# Patient Record
Sex: FEMALE | Race: WHITE | NOT HISPANIC OR LATINO
[De-identification: names, ages, dates, MRNs, and addresses within clinical notes are randomized per-mention and may not be internally consistent; named-entity substitution may affect disease eponyms.]

---

## 2023-03-17 PROBLEM — Z00.00 ENCOUNTER FOR PREVENTIVE HEALTH EXAMINATION: Status: ACTIVE | Noted: 2023-03-17

## 2023-03-20 ENCOUNTER — APPOINTMENT (OUTPATIENT)
Dept: VASCULAR SURGERY | Facility: CLINIC | Age: 73
End: 2023-03-20

## 2023-04-18 ENCOUNTER — INPATIENT (INPATIENT)
Facility: HOSPITAL | Age: 73
LOS: 2 days | Discharge: NURSING, REHAB OR LONG-TERM CARE | DRG: 603 | End: 2023-04-21
Attending: HOSPITALIST | Admitting: STUDENT IN AN ORGANIZED HEALTH CARE EDUCATION/TRAINING PROGRAM
Payer: MEDICARE

## 2023-04-18 VITALS
WEIGHT: 293 LBS | OXYGEN SATURATION: 94 % | DIASTOLIC BLOOD PRESSURE: 106 MMHG | TEMPERATURE: 98 F | HEART RATE: 76 BPM | SYSTOLIC BLOOD PRESSURE: 169 MMHG | RESPIRATION RATE: 18 BRPM | HEIGHT: 70 IN

## 2023-04-18 LAB
ALBUMIN SERPL ELPH-MCNC: 3.8 G/DL — SIGNIFICANT CHANGE UP (ref 3.3–5)
ALP SERPL-CCNC: 101 U/L — SIGNIFICANT CHANGE UP (ref 40–120)
ALT FLD-CCNC: SIGNIFICANT CHANGE UP (ref 10–45)
ANION GAP SERPL CALC-SCNC: 13 MMOL/L — SIGNIFICANT CHANGE UP (ref 5–17)
APTT BLD: 27.9 SEC — SIGNIFICANT CHANGE UP (ref 27.5–35.5)
AST SERPL-CCNC: SIGNIFICANT CHANGE UP (ref 10–40)
BASE EXCESS BLDV CALC-SCNC: 7 MMOL/L — HIGH (ref -2–3)
BASOPHILS # BLD AUTO: 0.04 K/UL — SIGNIFICANT CHANGE UP (ref 0–0.2)
BASOPHILS NFR BLD AUTO: 0.4 % — SIGNIFICANT CHANGE UP (ref 0–2)
BILIRUB SERPL-MCNC: 0.4 MG/DL — SIGNIFICANT CHANGE UP (ref 0.2–1.2)
BUN SERPL-MCNC: 24 MG/DL — HIGH (ref 7–23)
CA-I SERPL-SCNC: 1.17 MMOL/L — SIGNIFICANT CHANGE UP (ref 1.15–1.33)
CALCIUM SERPL-MCNC: 9.2 MG/DL — SIGNIFICANT CHANGE UP (ref 8.4–10.5)
CHLORIDE SERPL-SCNC: 96 MMOL/L — SIGNIFICANT CHANGE UP (ref 96–108)
CK MB CFR SERPL CALC: 1.8 NG/ML — SIGNIFICANT CHANGE UP (ref 0–6.7)
CK SERPL-CCNC: SIGNIFICANT CHANGE UP (ref 25–170)
CO2 BLDV-SCNC: 37.4 MMOL/L — HIGH (ref 22–26)
CO2 SERPL-SCNC: 27 MMOL/L — SIGNIFICANT CHANGE UP (ref 22–31)
CREAT SERPL-MCNC: 1.05 MG/DL — SIGNIFICANT CHANGE UP (ref 0.5–1.3)
EGFR: 57 ML/MIN/1.73M2 — LOW
EOSINOPHIL # BLD AUTO: 0.16 K/UL — SIGNIFICANT CHANGE UP (ref 0–0.5)
EOSINOPHIL NFR BLD AUTO: 1.5 % — SIGNIFICANT CHANGE UP (ref 0–6)
GAS PNL BLDV: 134 MMOL/L — LOW (ref 136–145)
GAS PNL BLDV: SIGNIFICANT CHANGE UP
GLUCOSE SERPL-MCNC: 134 MG/DL — HIGH (ref 70–99)
HCO3 BLDV-SCNC: 35 MMOL/L — HIGH (ref 22–29)
HCT VFR BLD CALC: 43.5 % — SIGNIFICANT CHANGE UP (ref 34.5–45)
HGB BLD-MCNC: 13.2 G/DL — SIGNIFICANT CHANGE UP (ref 11.5–15.5)
IMM GRANULOCYTES NFR BLD AUTO: 0.4 % — SIGNIFICANT CHANGE UP (ref 0–0.9)
INR BLD: 1.02 — SIGNIFICANT CHANGE UP (ref 0.88–1.16)
LACTATE SERPL-SCNC: 1.7 MMOL/L — SIGNIFICANT CHANGE UP (ref 0.5–2)
LYMPHOCYTES # BLD AUTO: 1.17 K/UL — SIGNIFICANT CHANGE UP (ref 1–3.3)
LYMPHOCYTES # BLD AUTO: 11.2 % — LOW (ref 13–44)
MCHC RBC-ENTMCNC: 26.5 PG — LOW (ref 27–34)
MCHC RBC-ENTMCNC: 30.3 GM/DL — LOW (ref 32–36)
MCV RBC AUTO: 87.3 FL — SIGNIFICANT CHANGE UP (ref 80–100)
MONOCYTES # BLD AUTO: 0.79 K/UL — SIGNIFICANT CHANGE UP (ref 0–0.9)
MONOCYTES NFR BLD AUTO: 7.5 % — SIGNIFICANT CHANGE UP (ref 2–14)
NEUTROPHILS # BLD AUTO: 8.28 K/UL — HIGH (ref 1.8–7.4)
NEUTROPHILS NFR BLD AUTO: 79 % — HIGH (ref 43–77)
NRBC # BLD: 0 /100 WBCS — SIGNIFICANT CHANGE UP (ref 0–0)
NT-PROBNP SERPL-SCNC: 83 PG/ML — SIGNIFICANT CHANGE UP (ref 0–300)
PCO2 BLDV: 67 MMHG — HIGH (ref 39–42)
PH BLDV: 7.33 — SIGNIFICANT CHANGE UP (ref 7.32–7.43)
PLATELET # BLD AUTO: 270 K/UL — SIGNIFICANT CHANGE UP (ref 150–400)
PO2 BLDV: <33 MMHG — SIGNIFICANT CHANGE UP (ref 25–45)
POTASSIUM BLDV-SCNC: 4.9 MMOL/L — SIGNIFICANT CHANGE UP (ref 3.5–5.1)
POTASSIUM SERPL-MCNC: SIGNIFICANT CHANGE UP (ref 3.5–5.3)
POTASSIUM SERPL-SCNC: SIGNIFICANT CHANGE UP (ref 3.5–5.3)
PROT SERPL-MCNC: 8.6 G/DL — HIGH (ref 6–8.3)
PROTHROM AB SERPL-ACNC: 12.1 SEC — SIGNIFICANT CHANGE UP (ref 10.5–13.4)
RBC # BLD: 4.98 M/UL — SIGNIFICANT CHANGE UP (ref 3.8–5.2)
RBC # FLD: 16.9 % — HIGH (ref 10.3–14.5)
SAO2 % BLDV: 34.8 % — LOW (ref 67–88)
SODIUM SERPL-SCNC: 136 MMOL/L — SIGNIFICANT CHANGE UP (ref 135–145)
WBC # BLD: 10.48 K/UL — SIGNIFICANT CHANGE UP (ref 3.8–10.5)
WBC # FLD AUTO: 10.48 K/UL — SIGNIFICANT CHANGE UP (ref 3.8–10.5)

## 2023-04-18 PROCEDURE — 99222 1ST HOSP IP/OBS MODERATE 55: CPT

## 2023-04-18 PROCEDURE — 71045 X-RAY EXAM CHEST 1 VIEW: CPT | Mod: 26

## 2023-04-18 PROCEDURE — 99285 EMERGENCY DEPT VISIT HI MDM: CPT

## 2023-04-18 PROCEDURE — 99221 1ST HOSP IP/OBS SF/LOW 40: CPT

## 2023-04-18 PROCEDURE — 99223 1ST HOSP IP/OBS HIGH 75: CPT | Mod: GC

## 2023-04-18 RX ORDER — ACETAMINOPHEN 500 MG
650 TABLET ORAL EVERY 6 HOURS
Refills: 0 | Status: DISCONTINUED | OUTPATIENT
Start: 2023-04-18 | End: 2023-04-21

## 2023-04-18 RX ORDER — LANOLIN ALCOHOL/MO/W.PET/CERES
3 CREAM (GRAM) TOPICAL AT BEDTIME
Refills: 0 | Status: DISCONTINUED | OUTPATIENT
Start: 2023-04-18 | End: 2023-04-21

## 2023-04-18 RX ORDER — ACETAMINOPHEN 500 MG
650 TABLET ORAL ONCE
Refills: 0 | Status: COMPLETED | OUTPATIENT
Start: 2023-04-18 | End: 2023-04-18

## 2023-04-18 RX ORDER — MORPHINE SULFATE 50 MG/1
2 CAPSULE, EXTENDED RELEASE ORAL ONCE
Refills: 0 | Status: DISCONTINUED | OUTPATIENT
Start: 2023-04-18 | End: 2023-04-18

## 2023-04-18 RX ORDER — PIPERACILLIN AND TAZOBACTAM 4; .5 G/20ML; G/20ML
3.38 INJECTION, POWDER, LYOPHILIZED, FOR SOLUTION INTRAVENOUS ONCE
Refills: 0 | Status: COMPLETED | OUTPATIENT
Start: 2023-04-18 | End: 2023-04-18

## 2023-04-18 RX ORDER — ENOXAPARIN SODIUM 100 MG/ML
40 INJECTION SUBCUTANEOUS EVERY 12 HOURS
Refills: 0 | Status: DISCONTINUED | OUTPATIENT
Start: 2023-04-19 | End: 2023-04-20

## 2023-04-18 RX ORDER — VANCOMYCIN HCL 1 G
1000 VIAL (EA) INTRAVENOUS ONCE
Refills: 0 | Status: COMPLETED | OUTPATIENT
Start: 2023-04-18 | End: 2023-04-18

## 2023-04-18 RX ADMIN — Medication 650 MILLIGRAM(S): at 21:48

## 2023-04-18 RX ADMIN — Medication 650 MILLIGRAM(S): at 20:50

## 2023-04-18 RX ADMIN — PIPERACILLIN AND TAZOBACTAM 3.38 GRAM(S): 4; .5 INJECTION, POWDER, LYOPHILIZED, FOR SOLUTION INTRAVENOUS at 23:43

## 2023-04-18 RX ADMIN — MORPHINE SULFATE 2 MILLIGRAM(S): 50 CAPSULE, EXTENDED RELEASE ORAL at 21:48

## 2023-04-18 RX ADMIN — Medication 250 MILLIGRAM(S): at 20:50

## 2023-04-18 RX ADMIN — MORPHINE SULFATE 2 MILLIGRAM(S): 50 CAPSULE, EXTENDED RELEASE ORAL at 21:56

## 2023-04-18 RX ADMIN — Medication 1000 MILLIGRAM(S): at 21:48

## 2023-04-18 RX ADMIN — PIPERACILLIN AND TAZOBACTAM 200 GRAM(S): 4; .5 INJECTION, POWDER, LYOPHILIZED, FOR SOLUTION INTRAVENOUS at 22:02

## 2023-04-18 NOTE — H&P ADULT - HISTORY OF PRESENT ILLNESS
72F PMH HTN, DVT (many yrs ago after childbirth, not on AC), chronic b/l LE lymphedema p/w concern for cellulitis. Pt reports fall roughly 6 months ago, required PRASHANT, and has been bedbound since return home. Was in several rehab facilities. Last hospitalization and abx roughly 5 months ago. Has chronic b/l LE edema/ulcers and redness. Pt reports visiting nurse twice a week. While evaluated earlier today both visiting nurse and patient noted increased redness in B/L LE extending to mid, medial thigh. Notably patient reports edema is unchanged (also extending to medial mid thigh) with overlying chronic skin changes. Patient also notes family contacted Dr. Chavis and was referred to [facility] ED. On ROS pt notes chronic intermittent lower back pain. Patient evaluated by vacular surgery in ED, neurovacularly intact.     Initial vital signs: T: 98.5F, HR: 72-76, BP: 169/106 improved to 126/66 s/p pain control, R: 14, SpO2: 96% RA    Labs: significant for 10.48, Hgb 13.2, Plt 270, no L shift present, Cr 1.05, unknown baseline, lactate 1.7, BUN 24, TP 8.6, albumin 3.8, glucose 134,     CXR: poor inspiratory effort without conolidation. CPA clear B/L.      EKG: NSR with T wave inversions of II, III, aVF, currently asymptomatic. Qtc on admission 460ms    Medications: tylenol 650mg x1, morphine 2mg x1, zosyn 3.375g IVPB (22:00), vanco 1g IVPB 20:50)

## 2023-04-18 NOTE — H&P ADULT - PROBLEM SELECTOR PLAN 2
Which has limited patient's ability to ambulate, despite multiple PRASHANT admissions. Patient notes stable edema with overlying skin changes reaching mid medial thigh. Per medical reconciliation patient on ammonium lactate 12%, clobetasol cream 0.05%, clotrimazole cream 1%, and nystatin powder q8h.   - restarted home ammonium lactate, clotrimazole, and nystatin  - holding clobetasol i/s/o overlying cellulitis   - f/u wound care   - f/u PT eval    #obesity: given BMI, chronic venous status changes, and a random glucose 134 on admission, there is concern for underlying DM v pre-DM  - f/u A1c in AM   - TSH WNL on admission

## 2023-04-18 NOTE — ED ADULT NURSE NOTE - OBJECTIVE STATEMENT
73 y/o female c/o b/l leg swelling and weeping w/redness. as per pt, "I have cellulitis and my legs are leaking." pt denies fever, numbness and tingling.

## 2023-04-18 NOTE — H&P ADULT - NSHPPHYSICALEXAM_GEN_ALL_CORE
.  VITAL SIGNS:  T(C): 36.9 (04-18-23 @ 22:11), Max: 36.9 (04-18-23 @ 22:11)  T(F): 98.5 (04-18-23 @ 22:11), Max: 98.5 (04-18-23 @ 22:11)  HR: 72 (04-18-23 @ 22:11) (72 - 76)  BP: 126/66 (04-18-23 @ 22:11) (126/66 - 169/106)  BP(mean): --  RR: 18 (04-18-23 @ 22:11) (18 - 18)  SpO2: 96% (04-18-23 @ 22:11) (94% - 96%)  Wt(kg): --    PHYSICAL EXAM:    Constitutional: WDWN resting comfortably in bed; NAD  Head: NC/AT  Eyes: PERRL, EOMI, anicteric sclera  ENT: no nasal discharge; uvula midline, no oropharyngeal erythema or exudates; MMM  Neck: supple; no JVD or thyromegaly  Respiratory: CTA B/L; no W/R/R, no retractions  Cardiac: +S1/S2; RRR; no M/R/G; PMI non-displaced  Gastrointestinal: abdomen soft, NT/ND; no rebound or guarding; +BSx4  Back: spine midline, no bony tenderness or step-offs; no CVAT B/L  Extremities: WWP, no clubbing or cyanosis; no peripheral edema  Musculoskeletal: NROM x4; no joint swelling, tenderness or erythema  Vascular: 2+ radial, femoral, DP/PT pulses B/L  Dermatologic: B/L LE with chronic venous stasis changes (thickening & edema) to medial mid thigh B/L. Purulence appreciated. LLE passive and active ROM limited for many months, RLE ROM intact.  Lymphatic: no submandibular or cervical LAD  Neurologic: AAOx3; CNII-XII grossly intact; no focal deficits  Psychiatric: affect and characteristics of appearance, verbalizations, behaviors are appropriate .  VITAL SIGNS:  T(C): 36.9 (04-18-23 @ 22:11), Max: 36.9 (04-18-23 @ 22:11)  T(F): 98.5 (04-18-23 @ 22:11), Max: 98.5 (04-18-23 @ 22:11)  HR: 72 (04-18-23 @ 22:11) (72 - 76)  BP: 126/66 (04-18-23 @ 22:11) (126/66 - 169/106)  BP(mean): --  RR: 18 (04-18-23 @ 22:11) (18 - 18)  SpO2: 96% (04-18-23 @ 22:11) (94% - 96%)  Wt(kg): --    PHYSICAL EXAM:    Constitutional: WDWN resting comfortably in bed; NAD  Head: NC/AT  Eyes: PERRL, EOMI, anicteric sclera  ENT: no nasal discharge; uvula midline, no oropharyngeal erythema or exudates; MMM  Neck: supple; no JVD or thyromegaly  Respiratory: CTA B/L; no W/R/R, no retractions  Cardiac: +S1/S2; RRR; no M/R/G; PMI non-displaced  Gastrointestinal: abdomen soft, NT/ND; no rebound or guarding; +BSx4  Back: spine midline, no bony tenderness or step-offs; no CVAT B/L  Extremities: 3+ LE edema, w/ overlying erythema and warmth. wrapped in ACE  Musculoskeletal: NROM x4; no joint swelling, tenderness or erythema  Vascular: 2+ radial, femoral, DP/PT pulses B/L  Dermatologic: B/L LE with chronic venous stasis changes (thickening & edema) to medial mid thigh B/L. Purulence appreciated. LLE passive and active ROM limited for many months, RLE ROM intact.  Lymphatic: no submandibular or cervical LAD  Neurologic: AAOx3; CNII-XII grossly intact; no focal deficits  Psychiatric: affect and characteristics of appearance, verbalizations, behaviors are appropriate

## 2023-04-18 NOTE — H&P ADULT - PROBLEM SELECTOR PLAN 5
F: tolerating PO  E: replete K<4, Mg<2  N: Dash/TLC  VTE Prophylaxis: Lovenox 40 Q24H  GI: not needed  D: RMF F: tolerating PO  E: replete K<4, Mg<2  N: Dash/TLC  VTE Prophylaxis: Lovenox 40 Q12h  GI: not needed  D: RMF

## 2023-04-18 NOTE — H&P ADULT - PROBLEM SELECTOR PLAN 1
Pt reports fall roughly 6 months ago, required PRASHANT, and has been bedbound since return home. Was in several rehab facilities with continued mobility limitations. Patient notes stable edema with worsening erythema to mid medial thigh, with purulence (mostly present on anterior shin). SIRS criteria 0/4 on admission without leukocytosis, tachypnea, tachycardia, or L shift.   - obtain wound culture  - f/u BCx  - f/u UA & UCx  - c/w vancomycin (obtain trough prior to 4th dose - roughly 8AM 4/20)  - per ED team zosyn added on after vascular evaluation, however, given lack of need for pseudomonas coverage will c/t monitor off zosyn Pt reports fall roughly 6 months ago, required PRASHANT, and has been bedbound since return home. Was in several rehab facilities with continued mobility limitations. Patient notes stable edema with worsening erythema to mid medial thigh, with purulence (mostly present on anterior shin). SIRS criteria 0/4 on admission without leukocytosis, tachypnea, tachycardia, or L shift.   - obtain wound culture  - f/u B/L LE doppler   - f/u BCx  - f/u UA & UCx  - c/w vancomycin (obtain trough prior to 4th dose - roughly 8AM 4/20)  - per ED team zosyn added on after vascular evaluation, however, given lack of need for pseudomonas coverage will c/t monitor off zosyn Pt reports fall roughly 6 months ago, required PRASHANT, and has been bedbound since return home. Was in several rehab facilities with continued mobility limitations. Patient notes stable edema with worsening erythema to mid medial thigh, with purulence (mostly present on anterior shin). SIRS criteria 0/4 on admission without leukocytosis, tachypnea, tachycardia, or L shift.   - obtain wound culture  - f/u B/L LE doppler   - f/u BCx  - f/u UA & UCx  - c/w vancomycin 2g q12h (obtain trough prior to 4th dose - roughly 8AM 4/20)  - per ED team zosyn added on after vascular evaluation, however, given lack of need for pseudomonas coverage will c/t monitor off zosyn

## 2023-04-18 NOTE — H&P ADULT - ASSESSMENT
72F PMH HTN, DVT (many yrs ago after childbirth, not on AC), chronic b/l LE lymphedema p/w concern for cellulitis. Purulence appreciated, received Vancomycin and Zosyn in ED, admitted to [facility] for further management.

## 2023-04-18 NOTE — ED PROVIDER NOTE - CADM POA CENTRAL LINE
You sign AMA, THIS COULD OCCUR IN THE DANGER OF HEART ATTACK, arrhythmias, severe chest pain, this  He take all the responsibilities for signing AMA  You need to follow-up ASAP with your cardiologist   He is informed with all these events  Labs Reviewed   CBC AND DIFFERENTIAL - Abnormal       Result Value Ref Range Status    WBC 7 17  4 31 - 10 16 Thousand/uL Final    RBC 5 12  3 81 - 5 12 Million/uL Final    Hemoglobin 13 8  11 5 - 15 4 g/dL Final    Hematocrit 43 1  34 8 - 46 1 % Final    MCV 84  82 - 98 fL Final    MCH 27 0  26 8 - 34 3 pg Final    MCHC 32 0  31 4 - 37 4 g/dL Final    RDW 13 7  11 6 - 15 1 % Final    MPV 11 7  8 9 - 12 7 fL Final    Platelets 275 (*) 788 - 390 Thousands/uL Final    Neutrophils Relative 70  43 - 75 % Final    Immat GRANS % 0  0 - 2 % Final    Lymphocytes Relative 22  14 - 44 % Final    Monocytes Relative 7  4 - 12 % Final    Eosinophils Relative 1  0 - 6 % Final    Basophils Relative 0  0 - 1 % Final    Neutrophils Absolute 5 02  1 85 - 7 62 Thousands/µL Final    Immature Grans Absolute 0 01  0 00 - 0 20 Thousand/uL Final    Lymphocytes Absolute 1 55  0 60 - 4 47 Thousands/µL Final    Monocytes Absolute 0 53  0 17 - 1 22 Thousand/µL Final    Eosinophils Absolute 0 04  0 00 - 0 61 Thousand/µL Final    Basophils Absolute 0 02  0 00 - 0 10 Thousands/µL Final   COMPREHENSIVE METABOLIC PANEL - Abnormal    Sodium 142  133 - 145 mmol/L Final    Potassium 4 6  3 5 - 5 0 mmol/L Final    Chloride 104  96 - 108 mmol/L Final    CO2 30  22 - 33 mmol/L Final    ANION GAP 8  4 - 13 mmol/L Final    BUN 17  6 - 20 mg/dL Final    Creatinine 1 52 (*) 0 40 - 1 10 mg/dL Final    Comment: Standardized to IDMS reference method    Glucose 89  65 - 140 mg/dL Final    Comment: If the patient is fasting, the ADA then defines impaired fasting glucose as > 100 mg/dL and diabetes as > or equal to 123 mg/dL    Specimen collection should occur prior to Sulfasalazine administration due to the potential for falsely depressed results  Specimen collection should occur prior to Sulfapyridine administration due to the potential for falsely elevated results  Calcium 9 9  8 4 - 10 2 mg/dL Final    AST 25  15 - 41 U/L Final    Comment: Specimen collection should occur prior to Sulfasalazine administration due to the potential for falsely depressed results  ALT 19  5 - 54 U/L Final    Comment: Specimen collection should occur prior to Sulfasalazine administration due to the potential for falsely depressed results  Alkaline Phosphatase 58 3  35 - 140 U/L Final    Total Protein 7 5  6 4 - 8 3 g/dL Final    Albumin 4 4  3 4 - 4 8 g/dL Final    Total Bilirubin 0 61  0 30 - 1 20 mg/dL Final    eGFR 38  ml/min/1 73sq m Final    Narrative:     [place] guidelines for Chronic Kidney Disease (CKD):     Stage 1 with normal or high GFR (GFR > 90 mL/min/1 73 square meters)    Stage 2 Mild CKD (GFR = 60-89 mL/min/1 73 square meters)    Stage 3A Moderate CKD (GFR = 45-59 mL/min/1 73 square meters)    Stage 3B Moderate CKD (GFR = 30-44 mL/min/1 73 square meters)    Stage 4 Severe CKD (GFR = 15-29 mL/min/1 73 square meters)    Stage 5 End Stage CKD (GFR <15 mL/min/1 73 square meters)  Note: GFR calculation is accurate only with a steady state creatinine   TROPONIN I - Abnormal    Troponin I 0 10 (*) 0 00 - 0 07 ng/mL Final    Comment: Results indicate test should be repeated on new specimen collected within 4-6 hours of the original  [employer] Chemistry analyzer 99% cutoff is > 0 03ng/mL    o cTnl 99% cutoff is useful only when applied to patients in the clinical setting of myocardial ischemia  o cTnl 99% cutoff should be interpreted in the context of clinical history, ECG findings and possibly cardiac imaging to establish correct diagnosis  o cTnl 99% cutoff may be suggestive but clearly not indicative of a coronary event without the clinical setting of myocardial ischemia       CT head wo contrast Final Result      No acute intracranial abnormality  Workstation performed: [id number]         XR chest portable   Final Result      No acute cardiopulmonary disease                    Workstation performed: [id number] No

## 2023-04-18 NOTE — ED PROVIDER NOTE - CLINICAL SUMMARY MEDICAL DECISION MAKING FREE TEXT BOX
72F PMH HTN, DVT (many yrs ago after childbirth, not on AC), chronic b/l LE lymphedema p/w concern for cellulitis. Pt states that she fell ~6 months ago and has been bedbound ever since. Was in several rehab facilities. Last hospitalization and abx ~5 months ago. Has chronic b/l LE edema/ulcers and redness. Pt states that she has visiting nurse twice a week and that today the nurse told her she should go to ER for likely cellulitis. Pt does note spreading redness and pain to her b/l thighs over lasts few days. States she/her family was in touch w/ Dr. Chavis and was referred to [facility] ED. On ROS pt notes chronic intermittent lower back pain. No other systemic symptoms.   Mild HTN, other vitals wnl. Exam as above.  ddx: Likely chronic skin changes/lymphedema w/ superimposed bacterial cellulitis.   Vascular consulted.   Labs, admission CXR/EKG, empiric abx, reassess.

## 2023-04-18 NOTE — ED PROVIDER NOTE - PROGRESS NOTE DETAILS
[place]: K/AST/ALT/CK hemolyzed (K 4.9 on VBG), BUN 24, pCO2 on VBG 67, other labs grossly wnl. Vascular recs pending. Will reassess. Kenzie: Evaluated by vascular who will continue to follow, will admit medicine for further care. Updated pt/daughter at bedside. Will give morphine for pain. Kenzie: Evaluated by vascular (also recommending zosyn) who will continue to follow, will admit medicine for further care. Updated pt/daughter at bedside. Will give morphine for pain.

## 2023-04-18 NOTE — H&P ADULT - NSHPREVIEWOFSYSTEMS_GEN_ALL_CORE
REVIEW OF SYSTEMS:    CONSTITUTIONAL: No weakness, fevers or chills  EYES/ENT: No visual changes, no blurry vision;  No vertigo or throat pain   NECK: No pain or stiffness  RESPIRATORY: No cough, wheezing, hemoptysis; No shortness of breath  CARDIOVASCULAR: No chest pain or palpitations  GASTROINTESTINAL: No abdominal or epigastric pain. No nausea, vomiting, or hematemesis; No diarrhea or constipation. No melena or hematochezia.  GENITOURINARY: No dysuria, frequency or hematuria  NEUROLOGICAL: No numbness or weakness  SKIN: No itching, rashes

## 2023-04-18 NOTE — H&P ADULT - PROBLEM SELECTOR PLAN 3
Patient no longer following with PMD (recently retired), on home toprol 50mg PO qd, spironolactone 25mg PO qd, furosemide 20mg PO qd, bumetanide 1mg PO q12h, KCl 20meq ER tab qd. While in ED patient SBP 160s, remained asymptomatic, corrected to 120s s/p pain control with 2mg IV morphine.   - restarted home meds   - tylenol 650mg q6hr PRN for pain Patient no longer following with PMD (recently retired), on home toprol 50mg PO qd, spironolactone 25mg PO qd, furosemide 20mg PO qd, bumetanide 1mg PO q12h, KCl 20meq ER tab qd. While in ED patient SBP 160s, remained asymptomatic, corrected to 120s s/p pain control with 2mg IV morphine.   - restarted home meds  - holding home furosemide 20mg PO qd as unclear why patient on both furosemide and bumetanide  - tylenol 650mg q6hr PRN for pain

## 2023-04-18 NOTE — H&P ADULT - NSHPLABSRESULTS_GEN_ALL_CORE
13.2   10.48 )-----------( 270      ( 18 Apr 2023 20:17 )             43.5       04-18    136  |  96  |  24<H>  ----------------------------<  134<H>  See Note   |  27  |  1.05    Ca    9.2      18 Apr 2023 20:17    TPro  8.6<H>  /  Alb  3.8  /  TBili  0.4  /  DBili  x   /  AST  See Note  /  ALT  See Note  /  AlkPhos  101  04-18                  PT/INR - ( 18 Apr 2023 20:17 )   PT: 12.1 sec;   INR: 1.02          PTT - ( 18 Apr 2023 20:17 )  PTT:27.9 sec    Lactate Trend  04-18 @ 20:17 Lactate:1.7       CARDIAC MARKERS ( 18 Apr 2023 20:17 )  x     / x     / See Note / x     / 1.8 ng/mL        CAPILLARY BLOOD GLUCOSE

## 2023-04-18 NOTE — ED ADULT NURSE NOTE - NSIMPLEMENTINTERV_GEN_ALL_ED
Implemented All Universal Safety Interventions:  [place] to call system. Call bell, personal items and telephone within reach. Instruct patient to call for assistance. Room bathroom lighting operational. Non-slip footwear when patient is off stretcher. Physically safe environment: no spills, clutter or unnecessary equipment. Stretcher in lowest position, wheels locked, appropriate side rails in place. Otolaryngologist Procedure Text (B): After obtaining clear surgical margins the patient was sent to otolaryngology for surgical repair.  The patient understands they will receive post-surgical care and follow-up from the referring physician's office.

## 2023-04-18 NOTE — ED PROVIDER NOTE - PHYSICAL EXAMINATION
b/l LE wrapped w/ ace wrap, kerlix and xeroform --> unwrapped - has scattered superficial ulcerations and serous weeping to b/l LE from knees to ankle.   Has blanching erythema and warmth from toes extending proximally to proximal/medial thigh. 3+ b/l LE pitting edema to mid thigh. unable to palpate DP pulse (likely 2/2 edema).   b/l LE strength 1/5.   scattered tophi on b/l feet.

## 2023-04-18 NOTE — H&P ADULT - ATTENDING COMMENTS
Chronic lymphedema c/b cellulitis: IV vancomycin, will hold zosyn for now. f/u BCx and wound cultures. Bumex 1 mg IV BID. LE dopplers to r/o DVT given h/o obesity, immobility, and previous DVT  HTN: resume home toprol and spironolactone

## 2023-04-19 DIAGNOSIS — I89.0 LYMPHEDEMA, NOT ELSEWHERE CLASSIFIED: ICD-10-CM

## 2023-04-19 DIAGNOSIS — I10 ESSENTIAL (PRIMARY) HYPERTENSION: ICD-10-CM

## 2023-04-19 DIAGNOSIS — L03.119 CELLULITIS OF UNSPECIFIED PART OF LIMB: ICD-10-CM

## 2023-04-19 DIAGNOSIS — M10.9 GOUT, UNSPECIFIED: ICD-10-CM

## 2023-04-19 DIAGNOSIS — Z29.9 ENCOUNTER FOR PROPHYLACTIC MEASURES, UNSPECIFIED: ICD-10-CM

## 2023-04-19 LAB
A1C WITH ESTIMATED AVERAGE GLUCOSE RESULT: 5.5 % — SIGNIFICANT CHANGE UP (ref 4–5.6)
ALBUMIN SERPL ELPH-MCNC: 3.4 G/DL — SIGNIFICANT CHANGE UP (ref 3.3–5)
ALP SERPL-CCNC: 77 U/L — SIGNIFICANT CHANGE UP (ref 40–120)
ALT FLD-CCNC: 10 U/L — SIGNIFICANT CHANGE UP (ref 10–45)
ANION GAP SERPL CALC-SCNC: 10 MMOL/L — SIGNIFICANT CHANGE UP (ref 5–17)
APPEARANCE UR: CLEAR — SIGNIFICANT CHANGE UP
AST SERPL-CCNC: 11 U/L — SIGNIFICANT CHANGE UP (ref 10–40)
BILIRUB SERPL-MCNC: 0.5 MG/DL — SIGNIFICANT CHANGE UP (ref 0.2–1.2)
BILIRUB UR-MCNC: NEGATIVE — SIGNIFICANT CHANGE UP
BUN SERPL-MCNC: 23 MG/DL — SIGNIFICANT CHANGE UP (ref 7–23)
CALCIUM SERPL-MCNC: 8.4 MG/DL — SIGNIFICANT CHANGE UP (ref 8.4–10.5)
CHLORIDE SERPL-SCNC: 99 MMOL/L — SIGNIFICANT CHANGE UP (ref 96–108)
CO2 SERPL-SCNC: 28 MMOL/L — SIGNIFICANT CHANGE UP (ref 22–31)
COLOR SPEC: YELLOW — SIGNIFICANT CHANGE UP
CREAT SERPL-MCNC: 1.09 MG/DL — SIGNIFICANT CHANGE UP (ref 0.5–1.3)
CRP SERPL-MCNC: 43.8 MG/L — HIGH (ref 0–4)
DIFF PNL FLD: NEGATIVE — SIGNIFICANT CHANGE UP
EGFR: 55 ML/MIN/1.73M2 — LOW
ERYTHROCYTE [SEDIMENTATION RATE] IN BLOOD: 64 MM/HR — HIGH
ESTIMATED AVERAGE GLUCOSE: 111 MG/DL — SIGNIFICANT CHANGE UP (ref 68–114)
GLUCOSE SERPL-MCNC: 109 MG/DL — HIGH (ref 70–99)
GLUCOSE UR QL: NEGATIVE — SIGNIFICANT CHANGE UP
GRAM STN FLD: SIGNIFICANT CHANGE UP
GRAM STN FLD: SIGNIFICANT CHANGE UP
HCV AB S/CO SERPL IA: 0.17 S/CO — SIGNIFICANT CHANGE UP (ref 0–0.99)
HCV AB SERPL-IMP: SIGNIFICANT CHANGE UP
KETONES UR-MCNC: NEGATIVE — SIGNIFICANT CHANGE UP
LEUKOCYTE ESTERASE UR-ACNC: NEGATIVE — SIGNIFICANT CHANGE UP
MAGNESIUM SERPL-MCNC: 2.2 MG/DL — SIGNIFICANT CHANGE UP (ref 1.6–2.6)
NITRITE UR-MCNC: NEGATIVE — SIGNIFICANT CHANGE UP
PH UR: 6 — SIGNIFICANT CHANGE UP (ref 5–8)
PHOSPHATE SERPL-MCNC: 3.8 MG/DL — SIGNIFICANT CHANGE UP (ref 2.5–4.5)
POTASSIUM SERPL-MCNC: 3.9 MMOL/L — SIGNIFICANT CHANGE UP (ref 3.5–5.3)
POTASSIUM SERPL-SCNC: 3.9 MMOL/L — SIGNIFICANT CHANGE UP (ref 3.5–5.3)
PROT SERPL-MCNC: 6.8 G/DL — SIGNIFICANT CHANGE UP (ref 6–8.3)
PROT UR-MCNC: NEGATIVE MG/DL — SIGNIFICANT CHANGE UP
SODIUM SERPL-SCNC: 137 MMOL/L — SIGNIFICANT CHANGE UP (ref 135–145)
SP GR SPEC: 1.02 — SIGNIFICANT CHANGE UP (ref 1–1.03)
SPECIMEN SOURCE: SIGNIFICANT CHANGE UP
SPECIMEN SOURCE: SIGNIFICANT CHANGE UP
TSH SERPL-MCNC: 2.63 UIU/ML — SIGNIFICANT CHANGE UP (ref 0.27–4.2)
UROBILINOGEN FLD QL: 0.2 E.U./DL — SIGNIFICANT CHANGE UP

## 2023-04-19 PROCEDURE — 99233 SBSQ HOSP IP/OBS HIGH 50: CPT | Mod: GC

## 2023-04-19 PROCEDURE — 93306 TTE W/DOPPLER COMPLETE: CPT | Mod: 26

## 2023-04-19 PROCEDURE — 93970 EXTREMITY STUDY: CPT | Mod: 26

## 2023-04-19 PROCEDURE — 99232 SBSQ HOSP IP/OBS MODERATE 35: CPT | Mod: GC

## 2023-04-19 RX ORDER — VANCOMYCIN HCL 1 G
2000 VIAL (EA) INTRAVENOUS EVERY 12 HOURS
Refills: 0 | Status: COMPLETED | OUTPATIENT
Start: 2023-04-19 | End: 2023-04-19

## 2023-04-19 RX ORDER — PIPERACILLIN AND TAZOBACTAM 4; .5 G/20ML; G/20ML
4.5 INJECTION, POWDER, LYOPHILIZED, FOR SOLUTION INTRAVENOUS EVERY 8 HOURS
Refills: 0 | Status: DISCONTINUED | OUTPATIENT
Start: 2023-04-20 | End: 2023-04-20

## 2023-04-19 RX ORDER — PIPERACILLIN AND TAZOBACTAM 4; .5 G/20ML; G/20ML
4.5 INJECTION, POWDER, LYOPHILIZED, FOR SOLUTION INTRAVENOUS ONCE
Refills: 0 | Status: COMPLETED | OUTPATIENT
Start: 2023-04-20 | End: 2023-04-20

## 2023-04-19 RX ORDER — NYSTATIN CREAM 100000 [USP'U]/G
1 CREAM TOPICAL EVERY 8 HOURS
Refills: 0 | Status: DISCONTINUED | OUTPATIENT
Start: 2023-04-19 | End: 2023-04-21

## 2023-04-19 RX ORDER — METOPROLOL TARTRATE 50 MG
50 TABLET ORAL DAILY
Refills: 0 | Status: DISCONTINUED | OUTPATIENT
Start: 2023-04-19 | End: 2023-04-21

## 2023-04-19 RX ORDER — KETOROLAC TROMETHAMINE 30 MG/ML
15 SYRINGE (ML) INJECTION ONCE
Refills: 0 | Status: DISCONTINUED | OUTPATIENT
Start: 2023-04-19 | End: 2023-04-19

## 2023-04-19 RX ORDER — TRAMADOL HYDROCHLORIDE 50 MG/1
25 TABLET ORAL ONCE
Refills: 0 | Status: DISCONTINUED | OUTPATIENT
Start: 2023-04-19 | End: 2023-04-19

## 2023-04-19 RX ORDER — VANCOMYCIN HCL 1 G
1250 VIAL (EA) INTRAVENOUS EVERY 12 HOURS
Refills: 0 | Status: DISCONTINUED | OUTPATIENT
Start: 2023-04-19 | End: 2023-04-19

## 2023-04-19 RX ORDER — SPIRONOLACTONE 25 MG/1
25 TABLET, FILM COATED ORAL DAILY
Refills: 0 | Status: DISCONTINUED | OUTPATIENT
Start: 2023-04-19 | End: 2023-04-20

## 2023-04-19 RX ORDER — VANCOMYCIN HCL 1 G
1000 VIAL (EA) INTRAVENOUS ONCE
Refills: 0 | Status: DISCONTINUED | OUTPATIENT
Start: 2023-04-19 | End: 2023-04-19

## 2023-04-19 RX ORDER — BUMETANIDE 0.25 MG/ML
1 INJECTION INTRAMUSCULAR; INTRAVENOUS EVERY 12 HOURS
Refills: 0 | Status: DISCONTINUED | OUTPATIENT
Start: 2023-04-19 | End: 2023-04-20

## 2023-04-19 RX ORDER — FUROSEMIDE 40 MG
20 TABLET ORAL DAILY
Refills: 0 | Status: DISCONTINUED | OUTPATIENT
Start: 2023-04-19 | End: 2023-04-19

## 2023-04-19 RX ORDER — PIPERACILLIN AND TAZOBACTAM 4; .5 G/20ML; G/20ML
4.5 INJECTION, POWDER, LYOPHILIZED, FOR SOLUTION INTRAVENOUS ONCE
Refills: 0 | Status: COMPLETED | OUTPATIENT
Start: 2023-04-19 | End: 2023-04-19

## 2023-04-19 RX ORDER — ALLOPURINOL 300 MG
100 TABLET ORAL DAILY
Refills: 0 | Status: DISCONTINUED | OUTPATIENT
Start: 2023-04-19 | End: 2023-04-21

## 2023-04-19 RX ORDER — POTASSIUM CHLORIDE 20 MEQ
20 PACKET (EA) ORAL DAILY
Refills: 0 | Status: DISCONTINUED | OUTPATIENT
Start: 2023-04-19 | End: 2023-04-21

## 2023-04-19 RX ORDER — SOD,AMMONIUM,POTASSIUM LACTATE
1 CREAM (GRAM) TOPICAL EVERY 12 HOURS
Refills: 0 | Status: DISCONTINUED | OUTPATIENT
Start: 2023-04-19 | End: 2023-04-21

## 2023-04-19 RX ADMIN — Medication 650 MILLIGRAM(S): at 01:10

## 2023-04-19 RX ADMIN — Medication 20 MILLIEQUIVALENT(S): at 09:08

## 2023-04-19 RX ADMIN — ENOXAPARIN SODIUM 40 MILLIGRAM(S): 100 INJECTION SUBCUTANEOUS at 07:50

## 2023-04-19 RX ADMIN — PIPERACILLIN AND TAZOBACTAM 25 GRAM(S): 4; .5 INJECTION, POWDER, LYOPHILIZED, FOR SOLUTION INTRAVENOUS at 17:33

## 2023-04-19 RX ADMIN — NYSTATIN CREAM 1 APPLICATION(S): 100000 CREAM TOPICAL at 06:22

## 2023-04-19 RX ADMIN — Medication 650 MILLIGRAM(S): at 02:10

## 2023-04-19 RX ADMIN — ENOXAPARIN SODIUM 40 MILLIGRAM(S): 100 INJECTION SUBCUTANEOUS at 22:33

## 2023-04-19 RX ADMIN — TRAMADOL HYDROCHLORIDE 25 MILLIGRAM(S): 50 TABLET ORAL at 12:58

## 2023-04-19 RX ADMIN — Medication 1 APPLICATION(S): at 06:22

## 2023-04-19 RX ADMIN — Medication 650 MILLIGRAM(S): at 08:45

## 2023-04-19 RX ADMIN — Medication 15 MILLIGRAM(S): at 18:01

## 2023-04-19 RX ADMIN — PIPERACILLIN AND TAZOBACTAM 200 GRAM(S): 4; .5 INJECTION, POWDER, LYOPHILIZED, FOR SOLUTION INTRAVENOUS at 09:09

## 2023-04-19 RX ADMIN — Medication 1 APPLICATION(S): at 17:31

## 2023-04-19 RX ADMIN — Medication 250 MILLIGRAM(S): at 22:32

## 2023-04-19 RX ADMIN — NYSTATIN CREAM 1 APPLICATION(S): 100000 CREAM TOPICAL at 22:53

## 2023-04-19 RX ADMIN — Medication 100 MILLIGRAM(S): at 09:08

## 2023-04-19 RX ADMIN — BUMETANIDE 1 MILLIGRAM(S): 0.25 INJECTION INTRAMUSCULAR; INTRAVENOUS at 07:50

## 2023-04-19 RX ADMIN — PIPERACILLIN AND TAZOBACTAM 25 GRAM(S): 4; .5 INJECTION, POWDER, LYOPHILIZED, FOR SOLUTION INTRAVENOUS at 12:28

## 2023-04-19 RX ADMIN — BUMETANIDE 1 MILLIGRAM(S): 0.25 INJECTION INTRAMUSCULAR; INTRAVENOUS at 22:33

## 2023-04-19 RX ADMIN — Medication 15 MILLIGRAM(S): at 17:31

## 2023-04-19 RX ADMIN — Medication 250 MILLIGRAM(S): at 09:21

## 2023-04-19 RX ADMIN — Medication 1 APPLICATION(S): at 17:35

## 2023-04-19 RX ADMIN — Medication 50 MILLIGRAM(S): at 07:49

## 2023-04-19 RX ADMIN — TRAMADOL HYDROCHLORIDE 25 MILLIGRAM(S): 50 TABLET ORAL at 13:58

## 2023-04-19 RX ADMIN — NYSTATIN CREAM 1 APPLICATION(S): 100000 CREAM TOPICAL at 15:29

## 2023-04-19 RX ADMIN — Medication 1 APPLICATION(S): at 06:23

## 2023-04-19 RX ADMIN — SPIRONOLACTONE 25 MILLIGRAM(S): 25 TABLET, FILM COATED ORAL at 09:08

## 2023-04-19 RX ADMIN — Medication 650 MILLIGRAM(S): at 07:49

## 2023-04-19 NOTE — DIETITIAN INITIAL EVALUATION ADULT - OTHER CALCULATIONS
Estimated calorie/protein/fluid needs based on upper IBW; adjusted for morbid obesity, presence of wounds

## 2023-04-19 NOTE — DIETITIAN INITIAL EVALUATION ADULT - OTHER INFO
72F PMH HTN, DVT (many yrs ago after childbirth, not on AC), chronic b/l LE lymphedema p/w concern for cellulitis. Currently admitted to [facility] for purulent cellulitis. 72F PMH HTN, DVT (many yrs ago after childbirth, not on AC), chronic b/l LE lymphedema p/w concern for cellulitis. Currently admitted to [facility] for purulent cellulitis.    Pt assessed at bedside. Started on DASH/TLC, kosher diet. Reports fair PO intake. Tolerating diet well, no N/V/D/C. Pt bedbound, decreased mobility and has lost significant lean mass although BMI remains >40. Wounds and pressure injuries noted. Reviewed adequate intake parameters, nutrient dense foods. Intermittent pain noted. Sam score 13. RD to follow, nutrition recommendations below.

## 2023-04-19 NOTE — PROGRESS NOTE ADULT - SUBJECTIVE AND OBJECTIVE BOX
CC: Patient is a 72y old  Female who presents with a chief complaint of B/L LE cellulitis (2023 08:37)    INTERVAL EVENTS: NITHYA    SUBJECTIVE / INTERVAL HPI: Patient seen and examined at bedside. States she is not feeling well today. Admits to mild improvement in leg pain since yesterday. Denies headaches, dizziness, fevers, chills, SOB, cough, nausea, vomiting, diarrhea, constipation, dysuria.    ROS: negative unless otherwise stated above.    VITAL SIGNS:  Vital Signs Last 24 Hrs  T(C): 36.3 (2023 06:14), Max: 36.9 (2023 22:11)  T(F): 97.3 (2023 06:14), Max: 98.5 (2023 22:11)  HR: 86 (2023 07:48) (72 - 86)  BP: 130/75 (2023 07:48) (126/66 - 169/106)  BP(mean): 102 (2023 00:55) (102 - 102)  RR: 18 (2023 07:48) (16 - 19)  SpO2: 94% (2023 07:48) (92% - 96%)    Parameters below as of 2023 07:48  Patient On (Oxygen Delivery Method): room air      23 @ 07:  -  23 @ 07:00  --------------------------------------------------------  IN: 0 mL / OUT: 450 mL / NET: -450 mL    23 @ 07:01  -  23 @ 10:00  --------------------------------------------------------  IN: 100 mL / OUT: 300 mL / NET: -200 mL      PHYSICAL EXAM:  Constitutional: morbidly obese female resting comfortably; NAD  HEENT: NC/AT, anicteric sclera, MMM  Neck: supple  Respiratory: CTA B/L; no W/R/R, no retractions  Cardiac: +S1/S2; RRR; no M/R/G  Gastrointestinal: soft, NT/ND; no rebound or guarding; +BS  Extremities: chronic venous changes with overlying 2+ pitting edema up to knees b/l, erythematous b/l LE up to mid-thigh with warmth on palpation, wrapped b/l LE, unable to appreciate purulence, skin thickening to medial right thigh  Musculoskeletal: LLE passive and active ROM limited for many months, RLE ROM intact, wiggles toes, full ROM in b/l UE  Vascular: 2+ radial pulses B/L, DP/PT pulses present on doppler  Dermatologic: erythematous abdominal skin folds, superficial ulceration on left buttock  Neurologic: AAOx3, no focal deficits  Psychiatric: calm, cooperative, behaviors are appropriate    MEDICATIONS:  MEDICATIONS  (STANDING):  allopurinol 100 milliGRAM(s) Oral daily  ammonium lactate 12% Lotion 1 Application(s) Topical every 12 hours  buMETAnide 1 milliGRAM(s) Oral every 12 hours  clotrimazole 1% Cream 1 Application(s) Topical every 12 hours  enoxaparin Injectable 40 milliGRAM(s) SubCutaneous every 12 hours  metoprolol succinate ER 50 milliGRAM(s) Oral daily  nystatin Powder 1 Application(s) Topical every 8 hours  piperacillin/tazobactam IVPB.- 4.5 Gram(s) IV Intermittent once  piperacillin/tazobactam IVPB.- 4.5 Gram(s) IV Intermittent once  potassium chloride    Tablet ER 20 milliEquivalent(s) Oral daily  spironolactone 25 milliGRAM(s) Oral daily  vancomycin  IVPB 2000 milliGRAM(s) IV Intermittent every 12 hours    MEDICATIONS  (PRN):  acetaminophen     Tablet .. 650 milliGRAM(s) Oral every 6 hours PRN Temp greater or equal to 38C (100.4F), Mild Pain (1 - 3)  melatonin 3 milliGRAM(s) Oral at bedtime PRN Insomnia      ALLERGIES:  Allergies    No Known Allergies    Intolerances        LABS:                        13.2   10.48 )-----------( 270      ( 2023 20:17 )             43.5     04-19    137  |  99  |  23  ----------------------------<  109<H>  3.9   |  28  |  1.09    Ca    8.4      2023 05:30  Phos  3.8     -  Mg     2.2         TPro  6.8  /  Alb  3.4  /  TBili  0.5  /  DBili  x   /  AST  11  /  ALT  10  /  AlkPhos  77  -    PT/INR - ( 2023 20:17 )   PT: 12.1 sec;   INR: 1.02          PTT - ( 2023 20:17 )  PTT:27.9 sec  Urinalysis Basic - ( 2023 07:00 )    Color: Yellow / Appearance: Clear / S.020 / pH: x  Gluc: x / Ketone: NEGATIVE  / Bili: Negative / Urobili: 0.2 E.U./dL   Blood: x / Protein: NEGATIVE mg/dL / Nitrite: NEGATIVE   Leuk Esterase: NEGATIVE / RBC: x / WBC x   Sq Epi: x / Non Sq Epi: x / Bacteria: x        RADIOLOGY & ADDITIONAL TESTS: Reviewed.

## 2023-04-19 NOTE — PROGRESS NOTE ADULT - PROBLEM SELECTOR PLAN 5
F: tolerating PO  E: replete K<4, Mg<2  N: Dash/TLC  VTE Prophylaxis: Lovenox 40 Q12h  GI: not needed  D: RMF

## 2023-04-19 NOTE — PATIENT PROFILE ADULT - STATED REASON FOR ADMISSION
"I fell at home yesterday prior to coming to the hospital. I fell on my knees and now have pain of my lower back that radiates to my left leg and left foot. Also cellulitis of my legs."

## 2023-04-19 NOTE — PROGRESS NOTE ADULT - ASSESSMENT
72F PMH HTN, DVT (many yrs ago after childbirth, not on AC), chronic b/l LE lymphedema p/w concern for cellulitis. Currently admitted to [facility] for purulent cellulitis.

## 2023-04-19 NOTE — PATIENT PROFILE ADULT - FALL HARM RISK - HARM RISK INTERVENTIONS
Assistance with ambulation/Assistance OOB with selected safe patient handling equipment/Communicate Risk of Fall with Harm to all staff/Discuss with provider need for PT consult/Monitor for mental status changes/Monitor gait and stability/Provide patient with walking aids - walker, cane, crutches/Reinforce activity limits and safety measures with patient and family/Review medications for side effects contributing to fall risk/Sit up slowly, dangle for a short time, stand at bedside before walking/Tailored Fall Risk Interventions/Toileting schedule using arm’s reach rule for commode and bathroom/Use of alarms - bed, chair and/or voice tab/Visual Cue: Yellow wristband and red socks/Bed in lowest position, wheels locked, appropriate side rails in place/Call bell, personal items and telephone in reach/Instruct patient to call for assistance before getting out of bed or chair/Non-slip footwear when patient is out of bed/[place] to call system/Physically safe environment - no spills, clutter or unnecessary equipment/Purposeful Proactive Rounding/Room/bathroom lighting operational, light cord in reach

## 2023-04-19 NOTE — PATIENT PROFILE ADULT - FUNCTIONAL ASSESSMENT - BASIC MOBILITY 6.
1-calculated by average/Not able to assess (calculate score using [facility] averaging method)

## 2023-04-19 NOTE — DIETITIAN INITIAL EVALUATION ADULT - NSFNSPHYEXAMSKINFT_GEN_A_CORE
Pressure Injury 1: Left:,proximal,buttocks, Suspected deep tissue injury  Pressure Injury 2: Left:,distal,buttocks, Stage II  Pressure Injury 3: none, none  Pressure Injury 4: none, none  Pressure Injury 5: none, none  Pressure Injury 6: none, none  Pressure Injury 7: none, none  Pressure Injury 8: none, none  Pressure Injury 9: none, none  Pressure Injury 10: none, none  Pressure Injury 11: none, none, Pressure Injury 1: Left:,proximal,buttocks, Suspected deep tissue injury  Pressure Injury 2: Left:,distal,buttocks, Stage II  Pressure Injury 3: none, none  Pressure Injury 4: none, none  Pressure Injury 5: none, none  Pressure Injury 6: none, none  Pressure Injury 7: none, none  Pressure Injury 8: none, none  Pressure Injury 9: none, none  Pressure Injury 10: none, none  Pressure Injury 11: none, none

## 2023-04-19 NOTE — PROGRESS NOTE ADULT - ASSESSMENT
72F PMH HTN, DVT (many yrs ago after childbirth, not on AC), chronic b/l LE lymphedema p/w concern for cellulitis. Pt states that she fell ~6 months ago and has been bedbound ever since. Was in several rehab facilities. Last hospitalization and abx ~5 months ago. Has chronic b/l LE edema/ulcers and redness. Pt states that she has visiting nurse twice a week and that today the nurse told her she should go to ER for likely cellulitis. Pt does note spreading redness and pain to her b/l thighs over lasts few days. vascular consulted for cellutitis     Recommendations:    - No acute vascular surgical intervention   - Continue abx  - Daily dressing changes with xeroform/ kerlix/ ace wrap  - Keep LEs elevated with pillows to above heart   - Rest of care per primary team  - Vascular surgery Team 3C will continue to follow. Please call [phone] with any questions or concerns.

## 2023-04-19 NOTE — DIETITIAN INITIAL EVALUATION ADULT - PROBLEM SELECTOR PLAN 3
Patient no longer following with PMD (recently retired), on home toprol 50mg PO qd, spironolactone 25mg PO qd, furosemide 20mg PO qd, bumetanide 1mg PO q12h, KCl 20meq ER tab qd. While in ED patient SBP 160s, remained asymptomatic, corrected to 120s s/p pain control with 2mg IV morphine.   - restarted home meds  - holding home furosemide 20mg PO qd as unclear why patient on both furosemide and bumetanide  - tylenol 650mg q6hr PRN for pain

## 2023-04-19 NOTE — DIETITIAN INITIAL EVALUATION ADULT - PERTINENT MEDS FT
MEDICATIONS  (STANDING):  allopurinol 100 milliGRAM(s) Oral daily  ammonium lactate 12% Lotion 1 Application(s) Topical every 12 hours  buMETAnide 1 milliGRAM(s) Oral every 12 hours  clotrimazole 1% Cream 1 Application(s) Topical every 12 hours  enoxaparin Injectable 40 milliGRAM(s) SubCutaneous every 12 hours  metoprolol succinate ER 50 milliGRAM(s) Oral daily  nystatin Powder 1 Application(s) Topical every 8 hours  piperacillin/tazobactam IVPB.- 4.5 Gram(s) IV Intermittent once  potassium chloride    Tablet ER 20 milliEquivalent(s) Oral daily  spironolactone 25 milliGRAM(s) Oral daily  vancomycin  IVPB 2000 milliGRAM(s) IV Intermittent every 12 hours    MEDICATIONS  (PRN):  acetaminophen     Tablet .. 650 milliGRAM(s) Oral every 6 hours PRN Temp greater or equal to 38C (100.4F), Mild Pain (1 - 3)  melatonin 3 milliGRAM(s) Oral at bedtime PRN Insomnia

## 2023-04-19 NOTE — PROGRESS NOTE ADULT - PROBLEM SELECTOR PLAN 1
Pt reports fall roughly 6 months ago, required [facility], and has been bedbound since return home. Was in several rehab facilities with continued mobility limitations. Patient notes stable edema with worsening erythema to mid medial thigh, with purulence (mostly present on anterior shin). SIRS criteria 0/4 on admission without leukocytosis, tachypnea, tachycardia, or L shift. Elevated ESR, CRP.   - obtain wound culture  - f/u B/L LE doppler   - f/u BCx  - f/u UA & UCx  - c/w vancomycin 2g q12h (obtain trough prior to 4th dose - roughly 8AM 4/20)  - per ED team zosyn added on after vascular evaluation, however, given lack of need for pseudomonas coverage will c/t monitor off zosyn Pt reports fall roughly 6 months ago with large wound to RLE, required [facility], and has been bedbound since return home. Was in several rehab facilities with continued mobility limitations. Admits to taking 2 courses of IV abx while at [facility], last one in January. Patient notes stable edema with worsening erythema to mid medial thigh, with purulence (mostly present on anterior shin). SIRS criteria 0/4 on admission without leukocytosis, tachypnea, tachycardia, or L shift. Elevated ESR, CRP. Possibly at risk for pseudomonas given recent abx use.   - f/u B/L LE doppler   - f/u BCx and wound cxs  - C/w Vancomycin 2g q12h (obtain trough prior to 4th dose - roughly 8AM 4/20)  - Started Zosyn 4.45g with pseudomonal coverage  - Tylenol 650mg q6hr PRN for pain

## 2023-04-19 NOTE — PROGRESS NOTE ADULT - PROBLEM SELECTOR PLAN 2
Which has limited patient's ability to ambulate, despite multiple [facility] admissions. Patient notes stable edema with overlying skin changes reaching mid medial thigh. Per medical reconciliation patient on ammonium lactate 12%, clobetasol cream 0.05%, clotrimazole cream 1%, and nystatin powder q8h.   - restarted home ammonium lactate, clotrimazole, and nystatin  - holding clobetasol i/s/o overlying cellulitis   - f/u wound care   - f/u PT eval    #obesity: given BMI, chronic venous status changes, and a random glucose 134 on admission, there is concern for underlying DM v pre-DM  - f/u A1c in AM   - TSH WNL on admission Which has limited patient's ability to ambulate, despite multiple [facility] admissions. Patient notes stable edema with overlying skin changes reaching mid medial thigh. Per medical reconciliation patient on ammonium lactate 12%, Clobetasol cream 0.05%, clotrimazole cream 1%, and nystatin powder q8h.   - restarted home ammonium lactate, clotrimazole, and nystatin  - holding clobetasol i/s/o overlying cellulitis   - f/u wound care   - f/u PT eval    #Obesity: given BMI, chronic venous status changes, and a random glucose 134 on admission, there is concern for underlying DM v pre-DM  - A1c wnl  - TSH wnl

## 2023-04-19 NOTE — PATIENT PROFILE ADULT - NSPROMEDSADMININFO_GEN_A_NUR
For large Pills crush or cut and place in apple sauce, or food./crush pills for administration/cut pills in half

## 2023-04-19 NOTE — PROGRESS NOTE ADULT - PROBLEM SELECTOR PLAN 4
on home allopurinol 100mg PO qd  - c/w home med  - f/u AM uric acid on home allopurinol 100mg PO qd  - c/w home med

## 2023-04-19 NOTE — PROGRESS NOTE ADULT - SUBJECTIVE AND OBJECTIVE BOX
SUBJECTIVE: Patient seen and examined bedside; doing well this am, explained that her wounds were getting better when she had the vns, but when she lost it, wounds got worse and that's when decided to come to hospital, otherwise no complaints.    buMETAnide 1 milliGRAM(s) Oral every 12 hours  enoxaparin Injectable 40 milliGRAM(s) SubCutaneous every 12 hours  metoprolol succinate ER 50 milliGRAM(s) Oral daily  piperacillin/tazobactam IVPB.- 4.5 Gram(s) IV Intermittent once  piperacillin/tazobactam IVPB.- 4.5 Gram(s) IV Intermittent once  spironolactone 25 milliGRAM(s) Oral daily  vancomycin  IVPB 2000 milliGRAM(s) IV Intermittent every 12 hours      Vital Signs Last 24 Hrs  T(C): 36.3 (2023 06:14), Max: 36.9 (2023 22:11)  T(F): 97.3 (2023 06:14), Max: 98.5 (2023 22:11)  HR: 86 (2023 07:48) (72 - 86)  BP: 130/75 (2023 07:48) (126/66 - 169/106)  BP(mean): 102 (2023 00:55) (102 - 102)  RR: 18 (2023 07:48) (16 - 19)  SpO2: 94% (2023 07:48) (92% - 96%)    Parameters below as of 2023 07:48  Patient On (Oxygen Delivery Method): room air      I&O's Detail    2023 07:01  -  2023 07:00  --------------------------------------------------------  IN:  Total IN: 0 mL    OUT:    Voided (mL): 450 mL  Total OUT: 450 mL    Total NET: -450 mL      2023 07:01  -  2023 12:20  --------------------------------------------------------  IN:    IV PiggyBack: 100 mL  Total IN: 100 mL    OUT:    Voided (mL): 300 mL  Total OUT: 300 mL    Total NET: -200 mL      PE:    General: NAD, resting comfortably in bed  C/V: NSR, s1 s2  Pulm: Nonlabored breathing, no respiratory distress  Abd: Obese, soft, NTND  Extrem: WWP, mild pitting edema +2, b/l erythematous shins with minimal weeping noted and small blisters, warm; rewrapped with xeroform, kerlix, ace, and elevated        LABS:                        13.2   10.48 )-----------( 270      ( 2023 20:17 )             43.5         137  |  99  |  23  ----------------------------<  109<H>  3.9   |  28  |  1.09    Ca    8.4      2023 05:30  Phos  3.8       Mg     2.2         TPro  6.8  /  Alb  3.4  /  TBili  0.5  /  DBili  x   /  AST  11  /  ALT  10  /  AlkPhos  77      PT/INR - ( 2023 20:17 )   PT: 12.1 sec;   INR: 1.02          PTT - ( 2023 20:17 )  PTT:27.9 sec  Urinalysis Basic - ( 2023 07:00 )    Color: Yellow / Appearance: Clear / S.020 / pH: x  Gluc: x / Ketone: NEGATIVE  / Bili: Negative / Urobili: 0.2 E.U./dL   Blood: x / Protein: NEGATIVE mg/dL / Nitrite: NEGATIVE   Leuk Esterase: NEGATIVE / RBC: x / WBC x   Sq Epi: x / Non Sq Epi: x / Bacteria: x        RADIOLOGY & ADDITIONAL STUDIES:

## 2023-04-19 NOTE — DIETITIAN INITIAL EVALUATION ADULT - ADD RECOMMEND
1. Continue DASH/TLC Kosher diet  2. Follow intake closely, monitor for adequate protein intake, adjust prn  3. Monitor electrolytes, adjust and replete prn  4. Pain and bowel regimen per team   5. RD diet edu prn

## 2023-04-19 NOTE — DIETITIAN INITIAL EVALUATION ADULT - PERTINENT LABORATORY DATA
04-19    137  |  99  |  23  ----------------------------<  109<H>  3.9   |  28  |  1.09    Ca    8.4      19 Apr 2023 05:30  Phos  3.8     04-19  Mg     2.2     04-19    TPro  6.8  /  Alb  3.4  /  TBili  0.5  /  DBili  x   /  AST  11  /  ALT  10  /  AlkPhos  77  04-19  A1C with Estimated Average Glucose Result: 5.5 % (04-19-23 @ 05:30)

## 2023-04-19 NOTE — CONSULT NOTE ADULT - ASSESSMENT
{\rtf1\jergjh66279\ansi\ohplhvy0270\ftnbj\uc1\deff0  {\fonttbl{\f0 \fnil Segoe UI;}{\f1 \fnil \fcharset0 Segoe UI;}{\f2 \fnil Times New Celestino;}}  {\colortbl ;\rep528\iraoy760\wfid349 ;\red0\green0\blue0 ;\red0\green0\tgbv925 ;\red0\green0\blue0 ;}  {\stylesheet{\f0\fs20 Normal;}{\cs1 Default Paragraph Font;}{\cs2\f0\fs16 Line Number;}{\cs3\f2\fs24\ul\cf3 Hyperlink;}}  {\*\revtbl{Unknown;}}  \pznprn01970\szwudu22366\kxctb7173\dencf5284\dbkqw3759\frsgp6123\mptgveu596\tphnjaw078\nogrowautofit\xjjuap441\formshade\nofeaturethrottle1\dntblnsbdb\fet4\aendnotes\aftnnrlc\pgbrdrhead\pgbrdrfoot  \sectd\jslbzx52781\whoxpa14139\guttersxn0\wxalaeya0684\kbsefduf1373\vwdvtgad9056\soodowdr1838\spqeeiv486\grcwpbq208\sbkpage\pgncont\pgndec  \plain\plain\f0\fs24\ql\plain\f0\fs24\plain\f0\fs20\laxf6471\hich\f0\dbch\f0\loch\f0\fs20 IM\par  \par  72 y o F PMH HTN, DVT (many yrs ago after childbirth, not on AC), chronic b/l LE lymphedema p/w concern for cellulitis. Purulence appreciated, received Vancomycin and Zosyn in ED, admitted to [facility] for further management. \par  \par  \plain\f1\fs20\zhth7067\hich\f1\dbch\f1\loch\f1\cf2\fs20\ul{\field{\*\fldinst HYPERLINK [id number] }{\fldrslt Problem/Plan - 1:}}\plain\f0\fs20\hjau7783\hich\f0\dbch\f0\loch\f0\fs20\ql\par  \'b7  {\*\bkmkstart [id number]}{\*\bkmkend fx20797097530}Problem: {\*\bkmkstart bd88607432206}{\*\bkmkend wq81822483485}Cellulitis of leg. \par  \'b7  {\*\bkmkstart xj54396322635}{\*\bkmkend ck75026073724}Plan: {\*\bkmkstart qh33744597931}{\*\bkmkend jq40678933013}Pt reports fall roughly 6 months ago, required [facility], and has been bedbound since return home. Was in several rehab facilities with continued   mobility limitations. Patient notes stable edema with worsening erythema to mid medial thigh, with purulence (mostly present on anterior shin). SIRS criteria 0/4 on admission without leukocytosis, tachypnea, tachycardia, or L shift. \par  - obtain wound culture\par  - f/u B/L LE doppler \par  - f/u BCx\par  - f/u UA & UCx\par  - c/w vancomycin 2g q12h (obtain trough prior to 4th dose - roughly 8AM 4/20)\par  - per ED team zosyn added on after vascular evaluation, however, given lack of need for pseudomonas coverage will c/t monitor off zosyn.\plain\f1\fs20\hvli0546\hich\f1\dbch\f1\loch\f1\cf2\fs20\strike\plain\f0\fs20\tbnm3270\hich\f0\dbch\f0\loch\f0\fs20\par  \par  \plain\f1\fs20\bvvb1258\hich\f1\dbch\f1\loch\f1\cf2\fs20\ul{\field{\*\fldinst HYPERLINK [id number] }{\fldrslt Problem/Plan - 2:}}\plain\f0\fs20\cbnv0541\hich\f0\dbch\f0\loch\f0\fs20\ql\par  \'b7  {\*\bkmkstart [id number]}{\*\bkmkend bo87600882848}Problem: {\*\bkmkstart rh09982176520}{\*\bkmkend df35355323877}Lymphedema. \par  \'b7  {\*\bkmkstart xc34064986273}{\*\bkmkend wx75124950468}Plan: {\*\bkmkstart pz79746146074}{\*\bkmkend gg02264617695}Which has limited patient's ability to ambulate, despite multiple [facility] admissions. Patient notes stable edema with overlying skin changes   reaching mid medial thigh. Per medical reconciliation patient on ammonium lactate 12%, clobetasol cream 0.05%, clotrimazole cream 1%, and nystatin powder q8h. \par  - restarted home ammonium lactate, clotrimazole, and nystatin\par  - holding clobetasol i/s/o overlying cellulitis \par  - f/u wound care \par  - f/u PT eval\par  \par  #obesity: given BMI, chronic venous status changes, and a random glucose 134 on admission, there is concern for underlying DM v pre-DM\par  - f/u A1c in AM \par  - TSH WNL on admission.\par  \par  \plain\f1\fs20\tmsn1658\hich\f1\dbch\f1\loch\f1\cf2\fs20\ul{\field{\*\fldinst HYPERLINK [id number] }{\fldrslt Problem/Plan - 3:}}\plain\f0\fs20\ftdm7871\hich\f0\dbch\f0\loch\f0\fs20\ql\par  \'b7  {\*\bkmkstart cp36527034436}{\*\bkmkend nl27309083804}Problem: {\*\bkmkstart uq25513831709}{\*\bkmkend gl42771621063}HTN (hypertension). \par  \'b7  {\*\bkmkstart if89297289293}{\*\bkmkend tu75409542401}Plan: {\*\bkmkstart em36333016998}{\*\bkmkend ej06387189578}Patient no longer following with PMD (recently retired), on home toprol 50mg PO qd, spironolactone 25mg PO qd, furosemide 20mg PO qd,   bumetanide 1mg PO q12h, KCl 20meq ER tab qd. While in ED patient SBP 160s, remained asymptomatic, corrected to 120s s/p pain control with 2mg IV morphine. \par  - restarted home meds\par  - holding home furosemide 20mg PO qd as unclear why patient on both furosemide and bumetanide\par  - tylenol 650mg q6hr PRN for pain.\par  \par  \plain\f1\fs20\kmpn6253\hich\f1\dbch\f1\loch\f1\cf2\fs20\ul{\field{\*\fldinst HYPERLINK [id number] }{\fldrslt Problem/Plan - 4:}}\plain\f0\fs20\wvpe3967\hich\f0\dbch\f0\loch\f0\fs20\ql\par  \'b7  {\*\bkmkstart yw81368918789}{\*\bkmkend cj91470122713}Problem: {\*\bkmkstart zw51781508490}{\*\bkmkend ua05866354807}Gout. \par  \'b7  {\*\bkmkstart vv79634131235}{\*\bkmkend gd22336815339}Plan: {\*\bkmkstart ky49080077421}{\*\bkmkend io51716824245}on home allopurinol 100mg PO qd\par  - c/w home med\par  - f/u AM uric acid.\par  \par  \plain\f1\fs20\ziul5652\hich\f1\dbch\f1\loch\f1\cf2\fs20\ul{\field{\*\fldinst HYPERLINK [id number] }{\fldrslt Problem/Plan - 5:}}\plain\f0\fs20\kiuu5033\hich\f0\dbch\f0\loch\f0\fs20\ql\par  \'b7  {\*\bkmkstart tc44057184147}{\*\bkmkend pw71334884249}Problem: {\*\bkmkstart ei60728297263}{\*\bkmkend ih13873237793}Need for prophylactic measure. \par  \'b7  {\*\bkmkstart da71690328503}{\*\bkmkend vs14584706843}Plan: {\*\bkmkstart sl61274743106}{\*\bkmkend ik43561346840}F: tolerating PO\par  E: replete K<4, Mg<2\par  N: Dash/TLC\par  VTE Prophylaxis: Lovenox 40 Q12h\par  GI: not needed\par  D: RMF.\plain\f1\fs20\tbyh0706\hich\f1\dbch\f1\loch\f1\cf2\fs20\strike\plain\f0\fs20\jmrt7742\hich\f0\dbch\f0\loch\f0\fs20\par  \par  }

## 2023-04-19 NOTE — DIETITIAN INITIAL EVALUATION ADULT - PROBLEM SELECTOR PLAN 1
Pt reports fall roughly 6 months ago, required [facility], and has been bedbound since return home. Was in several rehab facilities with continued mobility limitations. Patient notes stable edema with worsening erythema to mid medial thigh, with purulence (mostly present on anterior shin). SIRS criteria 0/4 on admission without leukocytosis, tachypnea, tachycardia, or L shift.   - obtain wound culture  - f/u B/L LE doppler   - f/u BCx  - f/u UA & UCx  - c/w vancomycin 2g q12h (obtain trough prior to 4th dose - roughly 8AM 4/20)  - per ED team zosyn added on after vascular evaluation, however, given lack of need for pseudomonas coverage will c/t monitor off zosyn

## 2023-04-19 NOTE — PROGRESS NOTE ADULT - PROBLEM SELECTOR PLAN 3
Patient no longer following with PMD (recently retired), on home toprol 50mg PO qd, spironolactone 25mg PO qd, furosemide 20mg PO qd, bumetanide 1mg PO q12h, KCl 20meq ER tab qd. While in ED patient SBP 160s, remained asymptomatic, corrected to 120s s/p pain control with 2mg IV morphine.   - restarted home meds  - holding home furosemide 20mg PO qd as unclear why patient on both furosemide and bumetanide  - tylenol 650mg q6hr PRN for pain Patient no longer following with PMD (recently retired), on home toprol 50mg PO qd, spironolactone 25mg PO qd, furosemide 20mg PO qd, bumetanide 1mg PO q12h, KCl 20meq ER tab qd. While in ED patient SBP 160s, remained asymptomatic, corrected to 120s s/p pain control with 2mg IV morphine.   - Holding home furosemide 20mg PO qd as unclear why patient on both furosemide and bumetanide  - Obtain collateral from PCP Dr. Whitmore

## 2023-04-20 DIAGNOSIS — N17.9 ACUTE KIDNEY FAILURE, UNSPECIFIED: ICD-10-CM

## 2023-04-20 DIAGNOSIS — I82.409 ACUTE EMBOLISM AND THROMBOSIS OF UNSPECIFIED DEEP VEINS OF UNSPECIFIED LOWER EXTREMITY: ICD-10-CM

## 2023-04-20 LAB
ALBUMIN SERPL ELPH-MCNC: 3.2 G/DL — LOW (ref 3.3–5)
ALP SERPL-CCNC: 75 U/L — SIGNIFICANT CHANGE UP (ref 40–120)
ALT FLD-CCNC: 11 U/L — SIGNIFICANT CHANGE UP (ref 10–45)
ANION GAP SERPL CALC-SCNC: 10 MMOL/L — SIGNIFICANT CHANGE UP (ref 5–17)
ANION GAP SERPL CALC-SCNC: 9 MMOL/L — SIGNIFICANT CHANGE UP (ref 5–17)
AST SERPL-CCNC: 11 U/L — SIGNIFICANT CHANGE UP (ref 10–40)
BASOPHILS # BLD AUTO: 0.03 K/UL — SIGNIFICANT CHANGE UP (ref 0–0.2)
BASOPHILS # BLD AUTO: 0.04 K/UL — SIGNIFICANT CHANGE UP (ref 0–0.2)
BASOPHILS NFR BLD AUTO: 0.4 % — SIGNIFICANT CHANGE UP (ref 0–2)
BASOPHILS NFR BLD AUTO: 0.5 % — SIGNIFICANT CHANGE UP (ref 0–2)
BILIRUB SERPL-MCNC: 0.4 MG/DL — SIGNIFICANT CHANGE UP (ref 0.2–1.2)
BUN SERPL-MCNC: 21 MG/DL — SIGNIFICANT CHANGE UP (ref 7–23)
BUN SERPL-MCNC: 23 MG/DL — SIGNIFICANT CHANGE UP (ref 7–23)
CALCIUM SERPL-MCNC: 8.8 MG/DL — SIGNIFICANT CHANGE UP (ref 8.4–10.5)
CALCIUM SERPL-MCNC: 8.8 MG/DL — SIGNIFICANT CHANGE UP (ref 8.4–10.5)
CHLORIDE SERPL-SCNC: 100 MMOL/L — SIGNIFICANT CHANGE UP (ref 96–108)
CHLORIDE SERPL-SCNC: 99 MMOL/L — SIGNIFICANT CHANGE UP (ref 96–108)
CO2 SERPL-SCNC: 28 MMOL/L — SIGNIFICANT CHANGE UP (ref 22–31)
CO2 SERPL-SCNC: 29 MMOL/L — SIGNIFICANT CHANGE UP (ref 22–31)
CREAT SERPL-MCNC: 1.28 MG/DL — SIGNIFICANT CHANGE UP (ref 0.5–1.3)
CREAT SERPL-MCNC: 1.3 MG/DL — SIGNIFICANT CHANGE UP (ref 0.5–1.3)
CRP SERPL-MCNC: 87 MG/L — HIGH (ref 0–4)
EGFR: 44 ML/MIN/1.73M2 — LOW
EGFR: 45 ML/MIN/1.73M2 — LOW
EOSINOPHIL # BLD AUTO: 0.36 K/UL — SIGNIFICANT CHANGE UP (ref 0–0.5)
EOSINOPHIL # BLD AUTO: 0.38 K/UL — SIGNIFICANT CHANGE UP (ref 0–0.5)
EOSINOPHIL NFR BLD AUTO: 4.9 % — SIGNIFICANT CHANGE UP (ref 0–6)
EOSINOPHIL NFR BLD AUTO: 5.1 % — SIGNIFICANT CHANGE UP (ref 0–6)
ERYTHROCYTE [SEDIMENTATION RATE] IN BLOOD: 77 MM/HR — HIGH
GLUCOSE SERPL-MCNC: 113 MG/DL — HIGH (ref 70–99)
GLUCOSE SERPL-MCNC: 99 MG/DL — SIGNIFICANT CHANGE UP (ref 70–99)
HCT VFR BLD CALC: 39.1 % — SIGNIFICANT CHANGE UP (ref 34.5–45)
HCT VFR BLD CALC: 39.9 % — SIGNIFICANT CHANGE UP (ref 34.5–45)
HCV RNA FLD QL NAA+PROBE: SIGNIFICANT CHANGE UP
HCV RNA SPEC QL PROBE+SIG AMP: SIGNIFICANT CHANGE UP
HGB BLD-MCNC: 11.7 G/DL — SIGNIFICANT CHANGE UP (ref 11.5–15.5)
HGB BLD-MCNC: 11.8 G/DL — SIGNIFICANT CHANGE UP (ref 11.5–15.5)
IMM GRANULOCYTES NFR BLD AUTO: 0.7 % — SIGNIFICANT CHANGE UP (ref 0–0.9)
IMM GRANULOCYTES NFR BLD AUTO: 0.7 % — SIGNIFICANT CHANGE UP (ref 0–0.9)
LYMPHOCYTES # BLD AUTO: 1.56 K/UL — SIGNIFICANT CHANGE UP (ref 1–3.3)
LYMPHOCYTES # BLD AUTO: 1.69 K/UL — SIGNIFICANT CHANGE UP (ref 1–3.3)
LYMPHOCYTES # BLD AUTO: 20.8 % — SIGNIFICANT CHANGE UP (ref 13–44)
LYMPHOCYTES # BLD AUTO: 23 % — SIGNIFICANT CHANGE UP (ref 13–44)
MCHC RBC-ENTMCNC: 26.2 PG — LOW (ref 27–34)
MCHC RBC-ENTMCNC: 26.5 PG — LOW (ref 27–34)
MCHC RBC-ENTMCNC: 29.6 GM/DL — LOW (ref 32–36)
MCHC RBC-ENTMCNC: 29.9 GM/DL — LOW (ref 32–36)
MCV RBC AUTO: 87.5 FL — SIGNIFICANT CHANGE UP (ref 80–100)
MCV RBC AUTO: 89.7 FL — SIGNIFICANT CHANGE UP (ref 80–100)
MONOCYTES # BLD AUTO: 0.82 K/UL — SIGNIFICANT CHANGE UP (ref 0–0.9)
MONOCYTES # BLD AUTO: 0.84 K/UL — SIGNIFICANT CHANGE UP (ref 0–0.9)
MONOCYTES NFR BLD AUTO: 11.2 % — SIGNIFICANT CHANGE UP (ref 2–14)
MONOCYTES NFR BLD AUTO: 11.2 % — SIGNIFICANT CHANGE UP (ref 2–14)
NEUTROPHILS # BLD AUTO: 4.39 K/UL — SIGNIFICANT CHANGE UP (ref 1.8–7.4)
NEUTROPHILS # BLD AUTO: 4.63 K/UL — SIGNIFICANT CHANGE UP (ref 1.8–7.4)
NEUTROPHILS NFR BLD AUTO: 59.7 % — SIGNIFICANT CHANGE UP (ref 43–77)
NEUTROPHILS NFR BLD AUTO: 61.8 % — SIGNIFICANT CHANGE UP (ref 43–77)
NRBC # BLD: 0 /100 WBCS — SIGNIFICANT CHANGE UP (ref 0–0)
NRBC # BLD: 0 /100 WBCS — SIGNIFICANT CHANGE UP (ref 0–0)
PLATELET # BLD AUTO: 255 K/UL — SIGNIFICANT CHANGE UP (ref 150–400)
PLATELET # BLD AUTO: 265 K/UL — SIGNIFICANT CHANGE UP (ref 150–400)
POTASSIUM SERPL-MCNC: 4.3 MMOL/L — SIGNIFICANT CHANGE UP (ref 3.5–5.3)
POTASSIUM SERPL-MCNC: 4.4 MMOL/L — SIGNIFICANT CHANGE UP (ref 3.5–5.3)
POTASSIUM SERPL-SCNC: 4.3 MMOL/L — SIGNIFICANT CHANGE UP (ref 3.5–5.3)
POTASSIUM SERPL-SCNC: 4.4 MMOL/L — SIGNIFICANT CHANGE UP (ref 3.5–5.3)
PROT SERPL-MCNC: 7.3 G/DL — SIGNIFICANT CHANGE UP (ref 6–8.3)
RBC # BLD: 4.45 M/UL — SIGNIFICANT CHANGE UP (ref 3.8–5.2)
RBC # BLD: 4.47 M/UL — SIGNIFICANT CHANGE UP (ref 3.8–5.2)
RBC # FLD: 17.1 % — HIGH (ref 10.3–14.5)
RBC # FLD: 17.2 % — HIGH (ref 10.3–14.5)
SODIUM SERPL-SCNC: 137 MMOL/L — SIGNIFICANT CHANGE UP (ref 135–145)
SODIUM SERPL-SCNC: 138 MMOL/L — SIGNIFICANT CHANGE UP (ref 135–145)
VANCOMYCIN TROUGH SERPL-MCNC: 17.2 UG/ML — SIGNIFICANT CHANGE UP (ref 10–20)
VANCOMYCIN TROUGH SERPL-MCNC: 26.4 UG/ML — CRITICAL HIGH (ref 10–20)
WBC # BLD: 7.35 K/UL — SIGNIFICANT CHANGE UP (ref 3.8–10.5)
WBC # BLD: 7.49 K/UL — SIGNIFICANT CHANGE UP (ref 3.8–10.5)
WBC # FLD AUTO: 7.35 K/UL — SIGNIFICANT CHANGE UP (ref 3.8–10.5)
WBC # FLD AUTO: 7.49 K/UL — SIGNIFICANT CHANGE UP (ref 3.8–10.5)

## 2023-04-20 PROCEDURE — 72131 CT LUMBAR SPINE W/O DYE: CPT | Mod: 26

## 2023-04-20 PROCEDURE — 72192 CT PELVIS W/O DYE: CPT | Mod: 26

## 2023-04-20 PROCEDURE — 99233 SBSQ HOSP IP/OBS HIGH 50: CPT | Mod: GC

## 2023-04-20 PROCEDURE — 99232 SBSQ HOSP IP/OBS MODERATE 35: CPT | Mod: GC

## 2023-04-20 RX ORDER — LINEZOLID 600 MG/300ML
600 INJECTION, SOLUTION INTRAVENOUS EVERY 12 HOURS
Refills: 0 | Status: DISCONTINUED | OUTPATIENT
Start: 2023-04-20 | End: 2023-04-21

## 2023-04-20 RX ORDER — ENOXAPARIN SODIUM 100 MG/ML
150 INJECTION SUBCUTANEOUS EVERY 12 HOURS
Refills: 0 | Status: DISCONTINUED | OUTPATIENT
Start: 2023-04-20 | End: 2023-04-21

## 2023-04-20 RX ORDER — CEFTRIAXONE 500 MG/1
2000 INJECTION, POWDER, FOR SOLUTION INTRAMUSCULAR; INTRAVENOUS EVERY 24 HOURS
Refills: 0 | Status: DISCONTINUED | OUTPATIENT
Start: 2023-04-20 | End: 2023-04-20

## 2023-04-20 RX ORDER — PIPERACILLIN AND TAZOBACTAM 4; .5 G/20ML; G/20ML
4.5 INJECTION, POWDER, LYOPHILIZED, FOR SOLUTION INTRAVENOUS EVERY 8 HOURS
Refills: 0 | Status: DISCONTINUED | OUTPATIENT
Start: 2023-04-20 | End: 2023-04-21

## 2023-04-20 RX ORDER — SODIUM CHLORIDE 9 MG/ML
1000 INJECTION INTRAMUSCULAR; INTRAVENOUS; SUBCUTANEOUS
Refills: 0 | Status: DISCONTINUED | OUTPATIENT
Start: 2023-04-20 | End: 2023-04-20

## 2023-04-20 RX ORDER — SODIUM CHLORIDE 9 MG/ML
500 INJECTION INTRAMUSCULAR; INTRAVENOUS; SUBCUTANEOUS ONCE
Refills: 0 | Status: COMPLETED | OUTPATIENT
Start: 2023-04-20 | End: 2023-04-20

## 2023-04-20 RX ADMIN — SODIUM CHLORIDE 500 MILLILITER(S): 9 INJECTION INTRAMUSCULAR; INTRAVENOUS; SUBCUTANEOUS at 12:09

## 2023-04-20 RX ADMIN — ENOXAPARIN SODIUM 150 MILLIGRAM(S): 100 INJECTION SUBCUTANEOUS at 06:15

## 2023-04-20 RX ADMIN — Medication 650 MILLIGRAM(S): at 12:54

## 2023-04-20 RX ADMIN — Medication 1 APPLICATION(S): at 18:49

## 2023-04-20 RX ADMIN — Medication 1 APPLICATION(S): at 18:22

## 2023-04-20 RX ADMIN — Medication 1 APPLICATION(S): at 07:56

## 2023-04-20 RX ADMIN — LINEZOLID 600 MILLIGRAM(S): 600 INJECTION, SOLUTION INTRAVENOUS at 21:51

## 2023-04-20 RX ADMIN — Medication 20 MILLIEQUIVALENT(S): at 11:52

## 2023-04-20 RX ADMIN — CEFTRIAXONE 100 MILLIGRAM(S): 500 INJECTION, POWDER, FOR SOLUTION INTRAMUSCULAR; INTRAVENOUS at 13:12

## 2023-04-20 RX ADMIN — BUMETANIDE 1 MILLIGRAM(S): 0.25 INJECTION INTRAMUSCULAR; INTRAVENOUS at 10:22

## 2023-04-20 RX ADMIN — NYSTATIN CREAM 1 APPLICATION(S): 100000 CREAM TOPICAL at 06:14

## 2023-04-20 RX ADMIN — NYSTATIN CREAM 1 APPLICATION(S): 100000 CREAM TOPICAL at 14:30

## 2023-04-20 RX ADMIN — Medication 50 MILLIGRAM(S): at 06:12

## 2023-04-20 RX ADMIN — Medication 650 MILLIGRAM(S): at 13:25

## 2023-04-20 RX ADMIN — Medication 100 MILLIGRAM(S): at 11:52

## 2023-04-20 RX ADMIN — NYSTATIN CREAM 1 APPLICATION(S): 100000 CREAM TOPICAL at 21:51

## 2023-04-20 RX ADMIN — PIPERACILLIN AND TAZOBACTAM 25 GRAM(S): 4; .5 INJECTION, POWDER, LYOPHILIZED, FOR SOLUTION INTRAVENOUS at 21:51

## 2023-04-20 RX ADMIN — PIPERACILLIN AND TAZOBACTAM 25 GRAM(S): 4; .5 INJECTION, POWDER, LYOPHILIZED, FOR SOLUTION INTRAVENOUS at 06:12

## 2023-04-20 RX ADMIN — ENOXAPARIN SODIUM 150 MILLIGRAM(S): 100 INJECTION SUBCUTANEOUS at 18:47

## 2023-04-20 RX ADMIN — SPIRONOLACTONE 25 MILLIGRAM(S): 25 TABLET, FILM COATED ORAL at 06:12

## 2023-04-20 RX ADMIN — Medication 1 APPLICATION(S): at 06:14

## 2023-04-20 RX ADMIN — PIPERACILLIN AND TAZOBACTAM 25 GRAM(S): 4; .5 INJECTION, POWDER, LYOPHILIZED, FOR SOLUTION INTRAVENOUS at 00:53

## 2023-04-20 NOTE — PROGRESS NOTE ADULT - ASSESSMENT
72F PMH HTN, DVT (many yrs ago after childbirth, not on AC), chronic b/l LE lymphedema p/w concern for cellulitis. Pt states that she fell ~6 months ago and has been bedbound ever since. Was in several rehab facilities. Last hospitalization and abx ~5 months ago. Has chronic b/l LE edema/ulcers and redness. Pt states that she has visiting nurse twice a week and that today the nurse told her she should go to ER for likely cellulitis. Pt does note spreading redness and pain to her b/l thighs over lasts few days. vascular consulted for cellutitis.     Recommendations:    - No acute vascular surgical intervention   - Continue abx  - Daily dressing changes with xeroform/ kerlix/ ace wrap. Dressing changes can be performed by primary team and/or nursing staff.  - Keep LEs elevated with pillows to above heart   - Rest of care per primary team  - Vascular surgery Team 3C will continue to follow. Please call [phone] with any questions or concerns.

## 2023-04-20 NOTE — PROGRESS NOTE ADULT - PROBLEM SELECTOR PLAN 1
Pt reports fall roughly 6 months ago with large wound to RLE, required [facility], and has been bedbound since return home. Was in several rehab facilities with continued mobility limitations. Admits to taking 2 courses of IV abx while at [facility], last one in January. Patient notes stable edema with worsening erythema to mid medial thigh, with purulence (mostly present on anterior shin). SIRS criteria 0/4 on admission without leukocytosis, tachypnea, tachycardia, or L shift. Elevated ESR, CRP. Possibly at risk for pseudomonas given recent abx use.   - f/u B/L LE doppler   - f/u BCx and wound cxs  - C/w Vancomycin 2g q12h (obtain trough prior to 4th dose - roughly 8AM 4/20)  - Started Zosyn 4.45g with pseudomonal coverage  - Tylenol 650mg q6hr PRN for pain Pt reports fall roughly 6 months ago with large wound to RLE, required [facility], and has been bedbound since return home. Was in several rehab facilities with continued mobility limitations. Admits to taking 2 courses of IV abx while at [facility], last one in January. Patient notes stable edema with worsening erythema to mid medial thigh, with purulence (mostly present on anterior shin). SIRS criteria 0/4 on admission without leukocytosis, tachypnea, tachycardia, or L shift. Elevated ESR, CRP and questionable purulence on admission. Patient at risk for pseudomonas and MRSA given recent abx use. Given supratherapeutic vanc trough, will hold vanc until trough normalizes and deescalate to CTX pending wound culture.   - HOLD Vancomycin 2g q12h given supratherapeutic trough  - Discontinue Zosyn 4.45g for pseudomonal coverage  - Start CTX 2g q24h pending cultures  - F/u BCx and wound cxs  - F/u repeat ESR/CRP  - F/u CT lumbar spine and pelvis given fall during EMS transport prior to admission  - Tylenol 650mg q6hr PRN for pain

## 2023-04-20 NOTE — CHART NOTE - NSCHARTNOTEFT_GEN_A_CORE
Infectious Diseases Anti-infective Approval Note    Medication: linezolid  Dose*: 600mg  Route: PO  Frequency: q12h  Duration**: 4d    *Dose may be adjusted as needed for alterations in renal function.    **Reflects duration of approval and not necessarily duration of therapy     THIS IS NOT AN INFECTIOUS DISEASES CONSULTATION

## 2023-04-20 NOTE — PROGRESS NOTE ADULT - ASSESSMENT
72F PMH HTN, DVT (many yrs ago after childbirth, not on AC), chronic b/l LE lymphedema p/w concern for cellulitis. Currently admitted to [facility] for purulent cellulitis. 72F PMH HTN, DVT (many yrs ago after childbirth, not on AC), chronic b/l LE lymphedema currently admitted to [facility] for b/l LE cellulitis.

## 2023-04-20 NOTE — PROGRESS NOTE ADULT - ATTENDING COMMENTS
Patient reports feeling improved. Leg erythema and " heaviness" improving. She was informed of the new DVT diagnosis and that AC was started. Risks and benefits of AC discussed, she is in agreement, denies history of bleeding in the past. Reports back pain since fall prior to admission(dropped by EMS), denies worsening in pain since initiation of AC, sensation intact, no weakness. She denies decreased UOP. No other complaints or events reported.    General: AOX3, NAD, lying in bed, speaking in full sentences, no labored breathing on RA  HEENT: AT/NC, no facial asymmetry  Lungs: limited, no crackles  Heart: RRR  Abdomen: Obese, no tenderness  Extremities:  warm, bilateral legs in ACE wrapping, erythema improved from marking, no focal deficit    Labs, reviewed    Acute DVT  JAMMIE   Cellulitis non purulent  Venous insufficiency     Patient with improving symptoms, get ESR/CRP. Follow-up wound cultures. Supratherapeutic Vanc, hold and get daily Vanc levels. Monitor Creatinine, gentle hydration, monitor UOP. Will transition to Ceftriaxone and monitor   Patient with new proximal DVT, started on Enoxaparin. BMI 45. Will get Anti Xa, if worsening creatinine, switch to Heparin gtt. Get CT lumbar and hip in view of pain post fall and new AC  Appreciate Vascular follow-up  Patient interested in [facility], SW informed

## 2023-04-20 NOTE — PROGRESS NOTE ADULT - SUBJECTIVE AND OBJECTIVE BOX
SUBJECTIVE: Patient seen and examined bedside; feeling well this am, legs are less painful and less heavy, able to move them without discomfort, she thinks they are definitely improving.    buMETAnide 1 milliGRAM(s) Oral every 12 hours  enoxaparin Injectable 150 milliGRAM(s) SubCutaneous every 12 hours  metoprolol succinate ER 50 milliGRAM(s) Oral daily  spironolactone 25 milliGRAM(s) Oral daily      Vital Signs Last 24 Hrs  T(C): 36.8 (2023 06:28), Max: 36.9 (2023 12:26)  T(F): 98.2 (2023 06:28), Max: 98.4 (2023 12:26)  HR: 76 (2023 06:28) (73 - 78)  BP: 137/74 (2023 06:28) (109/64 - 137/74)  BP(mean): --  RR: 19 (2023 06:28) (18 - 19)  SpO2: 96% (2023 06:28) (92% - 96%)    Parameters below as of 2023 06:28  Patient On (Oxygen Delivery Method): room air      I&O's Detail    2023 07:01  -  2023 07:00  --------------------------------------------------------  IN:    IV PiggyBack: 100 mL  Total IN: 100 mL    OUT:    Voided (mL): 1000 mL  Total OUT: 1000 mL    Total NET: -900 mL      PE:    General: NAD, resting comfortably in bed  C/V: NSR, s1 s2  Pulm: Nonlabored breathing, no respiratory distress  Abd: Obese, soft, NT/ND  Extrem: b/l LE mild pitting edema +1, erythematous form ankle to upper leg improved from prior exam, nontender, soft, warm, no obvious purulence or bleeding noted        LABS:                        11.8   7.35  )-----------( 265      ( 2023 05:30 )             39.9     04-20    137  |  99  |  23  ----------------------------<  113<H>  4.4   |  28  |  1.30    Ca    8.8      2023 05:30  Phos  3.8     -  Mg     2.2         TPro  6.8  /  Alb  3.4  /  TBili  0.5  /  DBili  x   /  AST  11  /  ALT  10  /  AlkPhos  77  -    PT/INR - ( 2023 20:17 )   PT: 12.1 sec;   INR: 1.02          PTT - ( 2023 20:17 )  PTT:27.9 sec  Urinalysis Basic - ( 2023 07:00 )    Color: Yellow / Appearance: Clear / S.020 / pH: x  Gluc: x / Ketone: NEGATIVE  / Bili: Negative / Urobili: 0.2 E.U./dL   Blood: x / Protein: NEGATIVE mg/dL / Nitrite: NEGATIVE   Leuk Esterase: NEGATIVE / RBC: x / WBC x   Sq Epi: x / Non Sq Epi: x / Bacteria: x        RADIOLOGY & ADDITIONAL STUDIES:

## 2023-04-20 NOTE — PROGRESS NOTE ADULT - SUBJECTIVE AND OBJECTIVE BOX
CC: Patient is a 72y old  Female who presents with a chief complaint of B/L LE cellulitis (2023 10:08)      INTERVAL EVENTS: NITHYA    SUBJECTIVE / INTERVAL HPI: Patient seen and examined at bedside.     ROS: negative unless otherwise stated above.    VITAL SIGNS:  Vital Signs Last 24 Hrs  T(C): 36.8 (2023 06:28), Max: 36.9 (2023 12:26)  T(F): 98.2 (2023 06:28), Max: 98.4 (2023 12:26)  HR: 76 (2023 06:28) (73 - 78)  BP: 137/74 (2023 06:28) (109/64 - 137/74)  BP(mean): --  RR: 19 (2023 06:28) (18 - 19)  SpO2: 96% (2023 06:28) (92% - 96%)    Parameters below as of 2023 06:28  Patient On (Oxygen Delivery Method): room air          23 @ 07:01  -  23 @ 07:00  --------------------------------------------------------  IN: 100 mL / OUT: 1000 mL / NET: -900 mL        PHYSICAL EXAM:  Constitutional: resting comfortably; NAD  HEENT: NC/AT, PERRL, EOMI, anicteric sclera, MMM  Neck: supple; no JVD or thyromegaly  Respiratory: CTA B/L; no W/R/R, no retractions  Cardiac: +S1/S2; RRR; no M/R/G  Gastrointestinal: soft, NT/ND; no rebound or guarding; +BSx4  Extremities: WWP, no clubbing or cyanosis; no peripheral edema  Musculoskeletal: NROM x4; no joint swelling, tenderness or erythema  Vascular: 2+ radial, DP/PT pulses B/L  Dermatologic: skin warm, dry and intact; no rashes, wounds, or scars  Neurologic: AAOx3, no focal deficits  Psychiatric: calm, cooperative, behaviors are appropriate, denies SI/HI    MEDICATIONS:  MEDICATIONS  (STANDING):  allopurinol 100 milliGRAM(s) Oral daily  ammonium lactate 12% Lotion 1 Application(s) Topical every 12 hours  buMETAnide 1 milliGRAM(s) Oral every 12 hours  cefTRIAXone   IVPB 2000 milliGRAM(s) IV Intermittent every 24 hours  clotrimazole 1% Cream 1 Application(s) Topical every 12 hours  enoxaparin Injectable 150 milliGRAM(s) SubCutaneous every 12 hours  metoprolol succinate ER 50 milliGRAM(s) Oral daily  nystatin Powder 1 Application(s) Topical every 8 hours  potassium chloride    Tablet ER 20 milliEquivalent(s) Oral daily  sodium chloride 0.9% Bolus 500 milliLiter(s) IV Bolus once  spironolactone 25 milliGRAM(s) Oral daily    MEDICATIONS  (PRN):  acetaminophen     Tablet .. 650 milliGRAM(s) Oral every 6 hours PRN Temp greater or equal to 38C (100.4F), Mild Pain (1 - 3)  melatonin 3 milliGRAM(s) Oral at bedtime PRN Insomnia      ALLERGIES:  Allergies    No Known Allergies    Intolerances        LABS:                        11.8   7.35  )-----------( 265      ( 2023 05:30 )             39.9     04-20    137  |  99  |  23  ----------------------------<  113<H>  4.4   |  28  |  1.30    Ca    8.8      2023 05:30  Phos  3.8     04-19  Mg     2.2     04-19    TPro  6.8  /  Alb  3.4  /  TBili  0.5  /  DBili  x   /  AST  11  /  ALT  10  /  AlkPhos  77  04-19    PT/INR - ( 2023 20:17 )   PT: 12.1 sec;   INR: 1.02          PTT - ( 2023 20:17 )  PTT:27.9 sec  Urinalysis Basic - ( 2023 07:00 )    Color: Yellow / Appearance: Clear / S.020 / pH: x  Gluc: x / Ketone: NEGATIVE  / Bili: Negative / Urobili: 0.2 E.U./dL   Blood: x / Protein: NEGATIVE mg/dL / Nitrite: NEGATIVE   Leuk Esterase: NEGATIVE / RBC: x / WBC x   Sq Epi: x / Non Sq Epi: x / Bacteria: x      CAPILLARY BLOOD GLUCOSE          RADIOLOGY & ADDITIONAL TESTS: Reviewed.   CC: Patient is a 72y old  Female who presents with a chief complaint of B/L LE cellulitis (2023 10:08)    INTERVAL EVENTS: Got LE dopplers done. Legs were rewrapped after.     SUBJECTIVE / INTERVAL HPI: Patient seen and examined at bedside. Feeling better today compared to yesterday. Feels the pain and swelling in her legs are improving. Admits to having back pain from a fall while EMS was transporting her before admission. States pain was severe overnight but is now better controlled. Admits to having better ROM today. Denies headaches, fevers, chills, chest pain, SOB, cough, N/V/D, constipation, dysuria. Last BM was yesterday, soft.     ROS: negative unless otherwise stated above.    VITAL SIGNS:  Vital Signs Last 24 Hrs  T(C): 36.8 (2023 06:28), Max: 36.9 (2023 12:26)  T(F): 98.2 (2023 06:28), Max: 98.4 (2023 12:26)  HR: 76 (2023 06:28) (73 - 78)  BP: 137/74 (2023 06:28) (109/64 - 137/74)  BP(mean): --  RR: 19 (2023 06:28) (18 - 19)  SpO2: 96% (2023 06:28) (92% - 96%)    Parameters below as of 2023 06:28  Patient On (Oxygen Delivery Method): room air      23 @ 07:01  -  23 @ 07:00  --------------------------------------------------------  IN: 100 mL / OUT: 1000 mL / NET: -900 mL      PHYSICAL EXAM:  Constitutional: morbidly obese female resting comfortably; NAD  HEENT: NC/AT, anicteric sclera, MMM  Neck: supple  Respiratory: CTA B/L; no W/R/R, no retractions  Cardiac: +S1/S2; RRR; no M/R/G  Gastrointestinal: soft, NT/ND; no rebound or guarding; +BS  Extremities: chronic venous changes with overlying 2+ pitting edema up to knees b/l, erythematous b/l LE up to knees with warmth on palpation, unable to appreciate purulence, skin thickening to medial right thigh (ALL IMPROVING)  Musculoskeletal: LLE passive and active ROM limited for many months, RLE ROM intact, wiggles toes, full ROM in b/l UE, no spinal or hip joint tenderness  Vascular: 2+ radial pulses B/L, DP/PT pulses present on doppler  Dermatologic: erythematous abdominal skin folds, superficial ulceration on left buttock  Neurologic: AAOx3, no focal deficits  Psychiatric: calm, cooperative, behaviors are appropriate    MEDICATIONS:  MEDICATIONS  (STANDING):  allopurinol 100 milliGRAM(s) Oral daily  ammonium lactate 12% Lotion 1 Application(s) Topical every 12 hours  buMETAnide 1 milliGRAM(s) Oral every 12 hours  cefTRIAXone   IVPB 2000 milliGRAM(s) IV Intermittent every 24 hours  clotrimazole 1% Cream 1 Application(s) Topical every 12 hours  enoxaparin Injectable 150 milliGRAM(s) SubCutaneous every 12 hours  metoprolol succinate ER 50 milliGRAM(s) Oral daily  nystatin Powder 1 Application(s) Topical every 8 hours  potassium chloride    Tablet ER 20 milliEquivalent(s) Oral daily  sodium chloride 0.9% Bolus 500 milliLiter(s) IV Bolus once  spironolactone 25 milliGRAM(s) Oral daily    MEDICATIONS  (PRN):  acetaminophen     Tablet .. 650 milliGRAM(s) Oral every 6 hours PRN Temp greater or equal to 38C (100.4F), Mild Pain (1 - 3)  melatonin 3 milliGRAM(s) Oral at bedtime PRN Insomnia      ALLERGIES:  Allergies    No Known Allergies    Intolerances        LABS:                        11.8   7.35  )-----------( 265      ( 2023 05:30 )             39.9     04-20    137  |  99  |  23  ----------------------------<  113<H>  4.4   |  28  |  1.30    Ca    8.8      2023 05:30  Phos  3.8     04-19  Mg     2.2     04-19    TPro  6.8  /  Alb  3.4  /  TBili  0.5  /  DBili  x   /  AST  11  /  ALT  10  /  AlkPhos  77  04-19    PT/INR - ( 2023 20:17 )   PT: 12.1 sec;   INR: 1.02          PTT - ( 2023 20:17 )  PTT:27.9 sec  Urinalysis Basic - ( 2023 07:00 )    Color: Yellow / Appearance: Clear / S.020 / pH: x  Gluc: x / Ketone: NEGATIVE  / Bili: Negative / Urobili: 0.2 E.U./dL   Blood: x / Protein: NEGATIVE mg/dL / Nitrite: NEGATIVE   Leuk Esterase: NEGATIVE / RBC: x / WBC x   Sq Epi: x / Non Sq Epi: x / Bacteria: x      RADIOLOGY & ADDITIONAL TESTS: Reviewed.

## 2023-04-20 NOTE — PROGRESS NOTE ADULT - PROBLEM SELECTOR PLAN 4
on home allopurinol 100mg PO qd  - c/w home med Which has limited patient's ability to ambulate, despite multiple [facility] admissions. Patient notes stable edema with overlying skin changes reaching mid medial thigh. Per medical reconciliation patient on ammonium lactate 12%, Clobetasol cream 0.05%, clotrimazole cream 1%, and nystatin powder q8h.   - restarted home ammonium lactate, clotrimazole, and nystatin  - holding clobetasol i/s/o overlying cellulitis   - f/u wound care   - f/u PT eval    #Obesity: given BMI, chronic venous status changes, and a random glucose 134 on admission, there is concern for underlying DM v pre-DM  - A1c wnl  - TSH wnl

## 2023-04-20 NOTE — PROGRESS NOTE ADULT - PROBLEM SELECTOR PLAN 2
Which has limited patient's ability to ambulate, despite multiple [facility] admissions. Patient notes stable edema with overlying skin changes reaching mid medial thigh. Per medical reconciliation patient on ammonium lactate 12%, Clobetasol cream 0.05%, clotrimazole cream 1%, and nystatin powder q8h.   - restarted home ammonium lactate, clotrimazole, and nystatin  - holding clobetasol i/s/o overlying cellulitis   - f/u wound care   - f/u PT eval    #Obesity: given BMI, chronic venous status changes, and a random glucose 134 on admission, there is concern for underlying DM v pre-DM  - A1c wnl  - TSH wnl Patient with new proximal DVT on RLE as seen on dopplers.   - Started Lovenox 1mg/kg q12h subq  - Given BMI 45, will obtain Anti Xa  - If worsening creatinine, switch to Heparin gtt

## 2023-04-20 NOTE — DIETITIAN NUTRITION RISK NOTIFICATION - TREATMENT: THE FOLLOWING DIET HAS BEEN RECOMMENDED
Diet, DASH/TLC:   Sodium & Cholesterol Restricted  Kosher (04-19-23 @ 08:58) [Active]      
Diet, DASH/TLC:   Sodium & Cholesterol Restricted  Kosher (04-19-23 @ 08:58) [Active]

## 2023-04-20 NOTE — PROGRESS NOTE ADULT - NUTRITIONAL ASSESSMENT
This patient has been assessed with a concern for Malnutrition and has been determined to have a diagnosis/diagnoses of Morbid obesity (BMI > 40).    This patient is being managed with:   Diet DASH/TLC-  Sodium & Cholesterol Restricted  Miriam  Entered: Apr 19 2023  8:59AM

## 2023-04-20 NOTE — PROGRESS NOTE ADULT - PROBLEM SELECTOR PLAN 5
F: tolerating PO  E: replete K<4, Mg<2  N: Dash/TLC  VTE Prophylaxis: Lovenox 40 Q12h  GI: not needed  D: RMF Patient no longer following with PMD (recently retired), on home toprol 50mg PO qd, spironolactone 25mg PO qd, furosemide 20mg PO qd, bumetanide 1mg PO q12h, KCl 20meq ER tab qd. While in ED patient SBP 160s, remained asymptomatic, corrected to 120s s/p pain control with 2mg IV morphine.   - Holding home diuretics for now as unclear why patient on both furosemide and bumetanide  - Obtain collateral from PCP Dr. Whitmore

## 2023-04-20 NOTE — PROGRESS NOTE ADULT - PROBLEM SELECTOR PLAN 7
F: tolerating PO  E: replete K<4, Mg<2  N: Dash/TLC  VTE Prophylaxis: Lovenox 150mg subq for DVT  GI: not needed  D: RMF

## 2023-04-20 NOTE — PROGRESS NOTE ADULT - PROBLEM SELECTOR PLAN 3
Patient no longer following with PMD (recently retired), on home toprol 50mg PO qd, spironolactone 25mg PO qd, furosemide 20mg PO qd, bumetanide 1mg PO q12h, KCl 20meq ER tab qd. While in ED patient SBP 160s, remained asymptomatic, corrected to 120s s/p pain control with 2mg IV morphine.   - Holding home furosemide 20mg PO qd as unclear why patient on both furosemide and bumetanide  - Obtain collateral from PCP Dr. Whitmore Patient with creatinine of 1.30 today, on admission was 1.0, classifying as JAMMIE with no known hx of CKD. Supratherapeutic Vanc trough this AM and currently on Zosyn with pseudomonal dosing which likely precipitated ATN.   - Hold vancomycin   - Will give gentle hydration  - Obtain daily vanc troughs  - Monitor Creatinine and UOP  - Consider restarting vancomycin if Cr improves and trough is wnl

## 2023-04-21 ENCOUNTER — TRANSCRIPTION ENCOUNTER (OUTPATIENT)
Age: 73
End: 2023-04-21

## 2023-04-21 VITALS — TEMPERATURE: 100 F

## 2023-04-21 LAB
-  AMPICILLIN/SULBACTAM: SIGNIFICANT CHANGE UP
-  AMPICILLIN: SIGNIFICANT CHANGE UP
-  AZTREONAM: SIGNIFICANT CHANGE UP
-  AZTREONAM: SIGNIFICANT CHANGE UP
-  CEFAZOLIN: SIGNIFICANT CHANGE UP
-  CEFEPIME: SIGNIFICANT CHANGE UP
-  CEFTRIAXONE: SIGNIFICANT CHANGE UP
-  CIPROFLOXACIN: SIGNIFICANT CHANGE UP
-  CLINDAMYCIN: SIGNIFICANT CHANGE UP
-  CLINDAMYCIN: SIGNIFICANT CHANGE UP
-  DAPTOMYCIN: SIGNIFICANT CHANGE UP
-  DAPTOMYCIN: SIGNIFICANT CHANGE UP
-  ERTAPENEM: SIGNIFICANT CHANGE UP
-  ERYTHROMYCIN: SIGNIFICANT CHANGE UP
-  ERYTHROMYCIN: SIGNIFICANT CHANGE UP
-  GENTAMICIN: SIGNIFICANT CHANGE UP
-  LEVOFLOXACIN: SIGNIFICANT CHANGE UP
-  LEVOFLOXACIN: SIGNIFICANT CHANGE UP
-  LINEZOLID: SIGNIFICANT CHANGE UP
-  LINEZOLID: SIGNIFICANT CHANGE UP
-  NITROFURANTOIN: SIGNIFICANT CHANGE UP
-  OXACILLIN: SIGNIFICANT CHANGE UP
-  OXACILLIN: SIGNIFICANT CHANGE UP
-  PIPERACILLIN/TAZOBACTAM: SIGNIFICANT CHANGE UP
-  RIFAMPIN: SIGNIFICANT CHANGE UP
-  RIFAMPIN: SIGNIFICANT CHANGE UP
-  TETRACYCLINE: SIGNIFICANT CHANGE UP
-  TOBRAMYCIN: SIGNIFICANT CHANGE UP
-  TRIMETHOPRIM/SULFAMETHOXAZOLE: SIGNIFICANT CHANGE UP
-  VANCOMYCIN: SIGNIFICANT CHANGE UP
ALBUMIN SERPL ELPH-MCNC: 3.4 G/DL — SIGNIFICANT CHANGE UP (ref 3.3–5)
ALP SERPL-CCNC: 82 U/L — SIGNIFICANT CHANGE UP (ref 40–120)
ALT FLD-CCNC: 11 U/L — SIGNIFICANT CHANGE UP (ref 10–45)
ANION GAP SERPL CALC-SCNC: 8 MMOL/L — SIGNIFICANT CHANGE UP (ref 5–17)
AST SERPL-CCNC: 11 U/L — SIGNIFICANT CHANGE UP (ref 10–40)
BASOPHILS # BLD AUTO: 0.03 K/UL — SIGNIFICANT CHANGE UP (ref 0–0.2)
BASOPHILS NFR BLD AUTO: 0.5 % — SIGNIFICANT CHANGE UP (ref 0–2)
BILIRUB SERPL-MCNC: 0.5 MG/DL — SIGNIFICANT CHANGE UP (ref 0.2–1.2)
BUN SERPL-MCNC: 20 MG/DL — SIGNIFICANT CHANGE UP (ref 7–23)
CALCIUM SERPL-MCNC: 9.1 MG/DL — SIGNIFICANT CHANGE UP (ref 8.4–10.5)
CHLORIDE SERPL-SCNC: 100 MMOL/L — SIGNIFICANT CHANGE UP (ref 96–108)
CO2 SERPL-SCNC: 29 MMOL/L — SIGNIFICANT CHANGE UP (ref 22–31)
CREAT SERPL-MCNC: 1.22 MG/DL — SIGNIFICANT CHANGE UP (ref 0.5–1.3)
CRP SERPL-MCNC: 51.6 MG/L — HIGH (ref 0–4)
CULTURE RESULTS: SIGNIFICANT CHANGE UP
CULTURE RESULTS: SIGNIFICANT CHANGE UP
EGFR: 48 ML/MIN/1.73M2 — LOW
EOSINOPHIL # BLD AUTO: 0.34 K/UL — SIGNIFICANT CHANGE UP (ref 0–0.5)
EOSINOPHIL NFR BLD AUTO: 5.5 % — SIGNIFICANT CHANGE UP (ref 0–6)
ERYTHROCYTE [SEDIMENTATION RATE] IN BLOOD: 84 MM/HR — HIGH
GLUCOSE SERPL-MCNC: 168 MG/DL — HIGH (ref 70–99)
HCT VFR BLD CALC: 39 % — SIGNIFICANT CHANGE UP (ref 34.5–45)
HGB BLD-MCNC: 12 G/DL — SIGNIFICANT CHANGE UP (ref 11.5–15.5)
IMM GRANULOCYTES NFR BLD AUTO: 0.8 % — SIGNIFICANT CHANGE UP (ref 0–0.9)
LMWH PPP CHRO-ACNC: 0.92 IU/ML — SIGNIFICANT CHANGE UP (ref 0.5–1.1)
LYMPHOCYTES # BLD AUTO: 1.26 K/UL — SIGNIFICANT CHANGE UP (ref 1–3.3)
LYMPHOCYTES # BLD AUTO: 20.4 % — SIGNIFICANT CHANGE UP (ref 13–44)
MAGNESIUM SERPL-MCNC: 2.2 MG/DL — SIGNIFICANT CHANGE UP (ref 1.6–2.6)
MCHC RBC-ENTMCNC: 26.6 PG — LOW (ref 27–34)
MCHC RBC-ENTMCNC: 30.8 GM/DL — LOW (ref 32–36)
MCV RBC AUTO: 86.5 FL — SIGNIFICANT CHANGE UP (ref 80–100)
METHOD TYPE: SIGNIFICANT CHANGE UP
MONOCYTES # BLD AUTO: 0.41 K/UL — SIGNIFICANT CHANGE UP (ref 0–0.9)
MONOCYTES NFR BLD AUTO: 6.6 % — SIGNIFICANT CHANGE UP (ref 2–14)
NEUTROPHILS # BLD AUTO: 4.09 K/UL — SIGNIFICANT CHANGE UP (ref 1.8–7.4)
NEUTROPHILS NFR BLD AUTO: 66.2 % — SIGNIFICANT CHANGE UP (ref 43–77)
NRBC # BLD: 0 /100 WBCS — SIGNIFICANT CHANGE UP (ref 0–0)
ORGANISM # SPEC MICROSCOPIC CNT: SIGNIFICANT CHANGE UP
PHOSPHATE SERPL-MCNC: 3.7 MG/DL — SIGNIFICANT CHANGE UP (ref 2.5–4.5)
PLATELET # BLD AUTO: 275 K/UL — SIGNIFICANT CHANGE UP (ref 150–400)
POTASSIUM SERPL-MCNC: 4.3 MMOL/L — SIGNIFICANT CHANGE UP (ref 3.5–5.3)
POTASSIUM SERPL-SCNC: 4.3 MMOL/L — SIGNIFICANT CHANGE UP (ref 3.5–5.3)
PROT SERPL-MCNC: 7.6 G/DL — SIGNIFICANT CHANGE UP (ref 6–8.3)
RBC # BLD: 4.51 M/UL — SIGNIFICANT CHANGE UP (ref 3.8–5.2)
RBC # FLD: 17 % — HIGH (ref 10.3–14.5)
SARS-COV-2 RNA SPEC QL NAA+PROBE: NEGATIVE — SIGNIFICANT CHANGE UP
SODIUM SERPL-SCNC: 137 MMOL/L — SIGNIFICANT CHANGE UP (ref 135–145)
SPECIMEN SOURCE: SIGNIFICANT CHANGE UP
SPECIMEN SOURCE: SIGNIFICANT CHANGE UP
VANCOMYCIN TROUGH SERPL-MCNC: 7.8 UG/ML — LOW (ref 10–20)
WBC # BLD: 6.18 K/UL — SIGNIFICANT CHANGE UP (ref 3.8–10.5)
WBC # FLD AUTO: 6.18 K/UL — SIGNIFICANT CHANGE UP (ref 3.8–10.5)

## 2023-04-21 PROCEDURE — 87635 SARS-COV-2 COVID-19 AMP PRB: CPT

## 2023-04-21 PROCEDURE — 86803 HEPATITIS C AB TEST: CPT

## 2023-04-21 PROCEDURE — 85652 RBC SED RATE AUTOMATED: CPT

## 2023-04-21 PROCEDURE — 97161 PT EVAL LOW COMPLEX 20 MIN: CPT

## 2023-04-21 PROCEDURE — 99285 EMERGENCY DEPT VISIT HI MDM: CPT

## 2023-04-21 PROCEDURE — 86140 C-REACTIVE PROTEIN: CPT

## 2023-04-21 PROCEDURE — 80048 BASIC METABOLIC PNL TOTAL CA: CPT

## 2023-04-21 PROCEDURE — 82330 ASSAY OF CALCIUM: CPT

## 2023-04-21 PROCEDURE — 87521 HEPATITIS C PROBE&RVRS TRNSC: CPT

## 2023-04-21 PROCEDURE — 85610 PROTHROMBIN TIME: CPT

## 2023-04-21 PROCEDURE — 87181 SC STD AGAR DILUTION PER AGT: CPT

## 2023-04-21 PROCEDURE — 84155 ASSAY OF PROTEIN SERUM: CPT

## 2023-04-21 PROCEDURE — 87186 SC STD MICRODIL/AGAR DIL: CPT

## 2023-04-21 PROCEDURE — 72192 CT PELVIS W/O DYE: CPT

## 2023-04-21 PROCEDURE — C8929: CPT

## 2023-04-21 PROCEDURE — 87040 BLOOD CULTURE FOR BACTERIA: CPT

## 2023-04-21 PROCEDURE — 81003 URINALYSIS AUTO W/O SCOPE: CPT

## 2023-04-21 PROCEDURE — 84165 PROTEIN E-PHORESIS SERUM: CPT

## 2023-04-21 PROCEDURE — 83036 HEMOGLOBIN GLYCOSYLATED A1C: CPT

## 2023-04-21 PROCEDURE — 80053 COMPREHEN METABOLIC PANEL: CPT

## 2023-04-21 PROCEDURE — 82803 BLOOD GASES ANY COMBINATION: CPT

## 2023-04-21 PROCEDURE — 87075 CULTR BACTERIA EXCEPT BLOOD: CPT

## 2023-04-21 PROCEDURE — 87070 CULTURE OTHR SPECIMN AEROBIC: CPT

## 2023-04-21 PROCEDURE — 83880 ASSAY OF NATRIURETIC PEPTIDE: CPT

## 2023-04-21 PROCEDURE — 86334 IMMUNOFIX E-PHORESIS SERUM: CPT

## 2023-04-21 PROCEDURE — 87086 URINE CULTURE/COLONY COUNT: CPT

## 2023-04-21 PROCEDURE — 72131 CT LUMBAR SPINE W/O DYE: CPT

## 2023-04-21 PROCEDURE — 83605 ASSAY OF LACTIC ACID: CPT

## 2023-04-21 PROCEDURE — 85025 COMPLETE CBC W/AUTO DIFF WBC: CPT

## 2023-04-21 PROCEDURE — 36415 COLL VENOUS BLD VENIPUNCTURE: CPT

## 2023-04-21 PROCEDURE — 84295 ASSAY OF SERUM SODIUM: CPT

## 2023-04-21 PROCEDURE — 82553 CREATINE MB FRACTION: CPT

## 2023-04-21 PROCEDURE — 71045 X-RAY EXAM CHEST 1 VIEW: CPT

## 2023-04-21 PROCEDURE — 96365 THER/PROPH/DIAG IV INF INIT: CPT

## 2023-04-21 PROCEDURE — 84132 ASSAY OF SERUM POTASSIUM: CPT

## 2023-04-21 PROCEDURE — 84443 ASSAY THYROID STIM HORMONE: CPT

## 2023-04-21 PROCEDURE — 82550 ASSAY OF CK (CPK): CPT

## 2023-04-21 PROCEDURE — 85520 HEPARIN ASSAY: CPT

## 2023-04-21 PROCEDURE — 84100 ASSAY OF PHOSPHORUS: CPT

## 2023-04-21 PROCEDURE — 83735 ASSAY OF MAGNESIUM: CPT

## 2023-04-21 PROCEDURE — 84166 PROTEIN E-PHORESIS/URINE/CSF: CPT

## 2023-04-21 PROCEDURE — 93970 EXTREMITY STUDY: CPT

## 2023-04-21 PROCEDURE — 85730 THROMBOPLASTIN TIME PARTIAL: CPT

## 2023-04-21 PROCEDURE — 80202 ASSAY OF VANCOMYCIN: CPT

## 2023-04-21 PROCEDURE — 99239 HOSP IP/OBS DSCHRG MGMT >30: CPT

## 2023-04-21 RX ORDER — POTASSIUM CHLORIDE 20 MEQ
1 PACKET (EA) ORAL
Qty: 0 | Refills: 0 | DISCHARGE
Start: 2023-04-21

## 2023-04-21 RX ORDER — APIXABAN 2.5 MG/1
10 TABLET, FILM COATED ORAL EVERY 12 HOURS
Refills: 0 | Status: DISCONTINUED | OUTPATIENT
Start: 2023-04-21 | End: 2023-04-21

## 2023-04-21 RX ORDER — PIPERACILLIN AND TAZOBACTAM 4; .5 G/20ML; G/20ML
4.5 INJECTION, POWDER, LYOPHILIZED, FOR SOLUTION INTRAVENOUS
Qty: 0 | Refills: 0 | DISCHARGE
Start: 2023-04-21 | End: 2023-04-26

## 2023-04-21 RX ORDER — LINEZOLID 600 MG/300ML
1 INJECTION, SOLUTION INTRAVENOUS
Qty: 0 | Refills: 0 | DISCHARGE
Start: 2023-04-21

## 2023-04-21 RX ORDER — ALLOPURINOL 300 MG
1 TABLET ORAL
Qty: 0 | Refills: 0 | DISCHARGE
Start: 2023-04-21

## 2023-04-21 RX ORDER — METOPROLOL TARTRATE 50 MG
1 TABLET ORAL
Qty: 0 | Refills: 0 | DISCHARGE
Start: 2023-04-21

## 2023-04-21 RX ORDER — APIXABAN 2.5 MG/1
2 TABLET, FILM COATED ORAL
Qty: 0 | Refills: 0 | DISCHARGE
Start: 2023-04-21

## 2023-04-21 RX ORDER — NYSTATIN CREAM 100000 [USP'U]/G
1 CREAM TOPICAL
Qty: 0 | Refills: 0 | DISCHARGE
Start: 2023-04-21

## 2023-04-21 RX ORDER — SOD,AMMONIUM,POTASSIUM LACTATE
1 CREAM (GRAM) TOPICAL
Qty: 0 | Refills: 0 | DISCHARGE
Start: 2023-04-21

## 2023-04-21 RX ADMIN — Medication 20 MILLIEQUIVALENT(S): at 11:51

## 2023-04-21 RX ADMIN — Medication 1 APPLICATION(S): at 06:40

## 2023-04-21 RX ADMIN — PIPERACILLIN AND TAZOBACTAM 25 GRAM(S): 4; .5 INJECTION, POWDER, LYOPHILIZED, FOR SOLUTION INTRAVENOUS at 06:40

## 2023-04-21 RX ADMIN — Medication 100 MILLIGRAM(S): at 11:51

## 2023-04-21 RX ADMIN — LINEZOLID 600 MILLIGRAM(S): 600 INJECTION, SOLUTION INTRAVENOUS at 06:41

## 2023-04-21 RX ADMIN — NYSTATIN CREAM 1 APPLICATION(S): 100000 CREAM TOPICAL at 06:41

## 2023-04-21 RX ADMIN — PIPERACILLIN AND TAZOBACTAM 25 GRAM(S): 4; .5 INJECTION, POWDER, LYOPHILIZED, FOR SOLUTION INTRAVENOUS at 13:51

## 2023-04-21 RX ADMIN — NYSTATIN CREAM 1 APPLICATION(S): 100000 CREAM TOPICAL at 13:51

## 2023-04-21 RX ADMIN — APIXABAN 10 MILLIGRAM(S): 2.5 TABLET, FILM COATED ORAL at 18:31

## 2023-04-21 RX ADMIN — LINEZOLID 600 MILLIGRAM(S): 600 INJECTION, SOLUTION INTRAVENOUS at 18:31

## 2023-04-21 RX ADMIN — Medication 50 MILLIGRAM(S): at 06:41

## 2023-04-21 RX ADMIN — ENOXAPARIN SODIUM 150 MILLIGRAM(S): 100 INJECTION SUBCUTANEOUS at 06:40

## 2023-04-21 RX ADMIN — Medication 1 APPLICATION(S): at 07:09

## 2023-04-21 NOTE — PROGRESS NOTE ADULT - ASSESSMENT
72F PMH HTN, DVT (many yrs ago after childbirth, not on AC), chronic b/l LE lymphedema p/w concern for cellulitis. Pt states that she fell ~6 months ago and has been bedbound ever since. Was in several rehab facilities. Last hospitalization and abx ~5 months ago. Has chronic b/l LE edema/ulcers and redness. Pt states that she has visiting nurse twice a week and that today the nurse told her she should go to ER for likely cellulitis. Pt does note spreading redness and pain to her b/l thighs over lasts few days. vascular consulted for cellutitis.     Recommendations:    - No acute vascular surgical intervention   - Continue abx  - Daily dressing changes with xeroform. DO NOT WRAP LEG. Dressing changes can be performed by nursing staff and/or primary team.  - Keep LEs elevated with pillows to above heart   - Rest of care per primary team  - Vascular surgery Team 3C will continue to follow. Please call [phone] with any questions or concerns.

## 2023-04-21 NOTE — PHYSICAL THERAPY INITIAL EVALUATION ADULT - LIVES WITH, PROFILE
NICOLE FROM [facility] CALLED THROUGH THE HUB TO ASK IF WE WOULD BE ABLE TO RE-FAX THE [employer] SUPPLIES ORDER FORM THAT HAD BEEN SIGNED BY , IN CHART ON 1/24/2023. SHE ASKED TO HAVE IT RE-FAXED, IN WHICH I DID.   
spouse

## 2023-04-21 NOTE — DISCHARGE NOTE PROVIDER - NSDCCPCAREPLAN_GEN_ALL_CORE_FT
PRINCIPAL DISCHARGE DIAGNOSIS  Diagnosis: Cellulitis  Assessment and Plan of Treatment: You were found to have a skin infection called cellulitis. You were treated with intravenous antibiotics during your stay at [facility]. Please continue to take Zosyn 4.5g IV every 8 hours until 4/26/24 and Linezolid 600mg PO every 12 hours until 4/24/26, and follow-up with your primary care physician. Should start to notice symptoms such as but not limited to: high persisting fevers (>104 F or lasting more than 3 days), severe pain, increased swelling and/or skin color changes after finishing your antibiotics, please return to the emergency department for interval evaluation.        SECONDARY DISCHARGE DIAGNOSES  Diagnosis: Acute deep vein thrombosis (DVT)  Assessment and Plan of Treatment:      PRINCIPAL DISCHARGE DIAGNOSIS  Diagnosis: Cellulitis  Assessment and Plan of Treatment: You were found to have a skin infection called cellulitis. You were treated with intravenous antibiotics during your stay at [facility]. Please continue to take Zosyn 4.5g IV every 8 hours until 4/26/24 and Linezolid 600mg PO every 12 hours until 4/24/26, and follow-up with your primary care physician. Should start to notice symptoms such as but not limited to: high persisting fevers (>104 F or lasting more than 3 days), severe pain, increased swelling and/or skin color changes after finishing your antibiotics, please return to the emergency department for interval evaluation.        SECONDARY DISCHARGE DIAGNOSES  Diagnosis: Acute deep vein thrombosis (DVT)  Assessment and Plan of Treatment: You were found to have DVT while in the hospital. A DVT is a blood clot in the veins. DVTs are treated with anticoagulation medications. You were started on Eliquis 10mg twice a day starting on 4/21/23. Please continue Eliquis 10mg twice a day through 4/27/23. On 4/28/23 please take Eliquis 5mg twice a day and follow up with your doctor at subacute rehab for resolution.       Diagnosis: Sacral fracture  Assessment and Plan of Treatment: You were found to have a L sacral fracture on a CT scan of your pelvis. You were evaluated by the orthopedic team, who determined that no surgical intervention is needed. You may bear weight as tolerated and continue with physical therapy and occupational therapy.     PRINCIPAL DISCHARGE DIAGNOSIS  Diagnosis: Cellulitis  Assessment and Plan of Treatment: You were found to have a skin infection called cellulitis. You were treated with intravenous antibiotics during your stay at [facility]. Please continue to take Zosyn 4.5g IV every 8 hours until 4/26/24 and Linezolid 600mg PO every 12 hours until 4/24/26, and follow-up with your primary care physician. Should start to notice symptoms such as but not limited to: high persisting fevers (>104 F or lasting more than 3 days), severe pain, increased swelling and/or skin color changes after finishing your antibiotics, please return to the emergency department for interval evaluation.        SECONDARY DISCHARGE DIAGNOSES  Diagnosis: Acute deep vein thrombosis (DVT)  Assessment and Plan of Treatment: You were found to have DVT while in the hospital. A DVT is a blood clot in the veins. DVTs are treated with anticoagulation medications. You were started on Eliquis 10mg twice a day starting on 4/21/23. Please continue Eliquis 10mg twice a day through 4/27/23. On 4/28/23 please take Eliquis 5mg twice a day and follow up with your doctor at subacute rehab for resolution.   Your DVT is located in the R leg both above the knee and below the knee. Above the right knee, the DVT was found in an area of swelling. Please monitor this area closely for any growth or infection.    Diagnosis: Sacral fracture  Assessment and Plan of Treatment: You were found to have a L sacral fracture on a CT scan of your pelvis. You were evaluated by the orthopedic team, who determined that no surgical intervention is needed. You may bear weight as tolerated and continue with physical therapy and occupational therapy.     PRINCIPAL DISCHARGE DIAGNOSIS  Diagnosis: Cellulitis  Assessment and Plan of Treatment: You were found to have a skin infection called cellulitis. You were treated with intravenous antibiotics during your stay at [facility]. Please continue to take Zosyn 4.5g IV every 8 hours until 4/26/24 and Linezolid 600mg PO every 12 hours until 4/24/26, and follow-up with your primary care physician. Should start to notice symptoms such as but not limited to: high persisting fevers (>104 F or lasting more than 3 days), severe pain, increased swelling and/or skin color changes after finishing your antibiotics, please return to the emergency department for interval evaluation or get a CT of your leg with IV contrast to r/o abscess        SECONDARY DISCHARGE DIAGNOSES  Diagnosis: Acute deep vein thrombosis (DVT)  Assessment and Plan of Treatment: You were found to have DVT while in the hospital. A DVT is a blood clot in the veins. DVTs are treated with anticoagulation medications. You were started on Eliquis 10mg twice a day starting on 4/21/23. Please continue Eliquis 10mg twice a day through 4/27/23. On 4/28/23 please take Eliquis 5mg twice a day and follow up with your doctor at subacute rehab for resolution.   Your DVT is located in the R leg both above the knee and below the knee. Above the right knee, the DVT was found in an area of swelling. Please monitor this area closely for any growth or infection.    Diagnosis: Sacral fracture  Assessment and Plan of Treatment: You were found to have a L sacral fracture on a CT scan of your pelvis. You were evaluated by the orthopedic team, who determined that no surgical intervention is needed. You may bear weight as tolerated and continue with physical therapy and occupational therapy.

## 2023-04-21 NOTE — PHYSICAL THERAPY INITIAL EVALUATION ADULT - PERTINENT HX OF CURRENT PROBLEM, REHAB EVAL
72F PMH HTN, DVT (many yrs ago after childbirth, not on AC), chronic b/l LE lymphedema p/w concern for cellulitis. Pt reports fall roughly 6 months ago, required [facility], and has been bedbound since return home. Was in several rehab facilities. Last hospitalization and abx roughly 5 months ago. Has chronic b/l LE edema/ulcers and redness. Pt reports visiting nurse twice a week. While evaluated earlier today both visiting nurse and patient noted increased redness in B/L LE extending to mid, medial thigh. Notably patient reports edema is unchanged (also extending to medial mid thigh) with overlying chronic skin changes. Patient also notes family contacted Dr. Chavis and was referred to [facility] ED. On ROS pt notes chronic intermittent lower back pain. Patient evaluated by vacular surgery in ED, neurovacularly intact.

## 2023-04-21 NOTE — DISCHARGE NOTE PROVIDER - ATTENDING DISCHARGE PHYSICAL EXAMINATION:
Patient was seen and examined at bedside on 4/21/2023 at 11 am. She reports that her lower extremities have dramatically improved. Denies SOB, CP. ROS is otherwise negative. Vitals, labwork and pertinent imaging reviewed - afebrile, no leukocytosis. Exam - NAD, AAO x 4, PERRLA, EOMI, MMM, supple neck, chest - CTA b/l, CV - rrr, s1s2, no m/r/g, abd - soft, NTND, + BS, ext - wwp, b/le LE lymphedema, b/l receding erythema, no TTP, RLE popliteal region w/ improving area of induration, psych - normal affect    Plan  D/c to [facility] on abx, she should finish a 7-10 day course  -Patient instructed on need for weight loss as underlying contributor to many of her issues, will need to ACE wraps both extremities once infection resolves

## 2023-04-21 NOTE — DISCHARGE NOTE PROVIDER - CARE PROVIDER_API CALL
Carlos Shelby)  Internal Medicine  [address], [address] [place] [address]  Phone: [phone]  Fax: [phone]  Follow Up Time:

## 2023-04-21 NOTE — DISCHARGE NOTE PROVIDER - DETAILS OF MALNUTRITION DIAGNOSIS/DIAGNOSES
This patient has been assessed with a concern for Malnutrition and was treated during this hospitalization for the following Nutrition diagnosis/diagnoses:     -  04/20/2023: Morbid obesity (BMI > 40)   -  04/19/2023: Morbid obesity (BMI > 40)

## 2023-04-21 NOTE — PHYSICAL THERAPY INITIAL EVALUATION ADULT - ADDITIONAL COMMENTS
Pt lives in a private home with , 4 ELVIRA and 15 ELVIRA in the home. Pt d/c from Subacute Rehab and since then has not been able to ambulate in the home and has been in the a w/c or recliner.

## 2023-04-21 NOTE — DISCHARGE NOTE NURSING/CASE MANAGEMENT/SOCIAL WORK - PATIENT PORTAL LINK FT
You can access the FollowMyHealth Patient Portal offered by [facility] by registering at the following website: [email or web address]. By joining [employer]’s FollowMyHealth portal, you will also be able to view your health information using other applications (apps) compatible with our system.

## 2023-04-21 NOTE — DISCHARGE NOTE PROVIDER - HOSPITAL COURSE
#Discharge: do not delete    72F PMH HTN, DVT (many yrs ago after childbirth, not on AC), chronic b/l LE lymphedema, admitted to [facility] for b/l LE cellulitis treated with IV antibiotics.     ·  Problem: Cellulitis of leg.   ·  Plan: Pt reports fall roughly 6 months ago with large wound to RLE, required [facility], and has been bedbound since return home. Was in several rehab facilities with continued mobility limitations. Admits to taking 2 courses of IV abx while at [facility], last one in January. Patient notes stable edema with worsening erythema to mid medial thigh, with purulence (mostly present on anterior shin). SIRS criteria 0/4 on admission without leukocytosis, tachypnea, tachycardia, or L shift. Elevated ESR, CRP. Wound culture growing Proteus mirabilis, Pseudomonas aeruginosa, Citrobacter koseri, MRSA and staph epidermidis.   - Continue linezolid 600mg oral BID  - Continue zosyn 4.5g IV q8  - Tylenol 650mg q6hr PRN for pain.    ·  Problem: Acute deep vein thrombosis (DVT).  ·  Plan: Patient with new proximal DVT on RLE as seen on dopplers. Anti Xa 0.92.   - Started Lovenox 1mg/kg q12h subq  - switched to Eliquis 10mg BID for 7d (4/21-4/27) followed by Eliquis 5mg BID    ·  Problem: Lymphedema.  ·  Plan: Which has limited patient's ability to ambulate, despite multiple [facility] admissions. Patient notes stable edema with overlying skin changes reaching mid medial thigh. Per medical reconciliation patient on ammonium lactate 12%, Clobetasol cream 0.05%, clotrimazole cream 1%, and nystatin powder q8h.   - restarted home ammonium lactate, clotrimazole, and nystatin  - holding clobetasol i/s/o overlying cellulitis     ·  Problem: HTN (hypertension).  ·  Plan: Patient no longer following with PMD (recently retired), on home toprol 50mg PO qd, spironolactone 25mg PO qd, furosemide 20mg PO qd, bumetanide 1mg PO q12h, KCl 20meq ER tab qd. While in ED patient SBP 160s, remained asymptomatic, corrected to 120s s/p pain control with 2mg IV morphine.   - Holding home diuretics for now as unclear why patient on both furosemide and bumetanide  - Obtain collateral from PCP Dr. Whitmore.    ·  Problem: Gout.   ·  Plan: on home allopurinol 100mg PO qd  - c/w home med.         Patient was discharged to: [facility]  New medications:  Changes to old medications:  Medications that were stopped:   Items to follow up as outpatient:  Physical exam at the time of discharge: #Discharge: do not delete    72F PMH HTN, DVT (many yrs ago after childbirth, not on AC), chronic b/l LE lymphedema, admitted to [facility] for b/l LE cellulitis treated with IV antibiotics.     ·  Problem: Cellulitis of leg.   ·  Plan: Pt reports fall roughly 6 months ago with large wound to RLE, required [facility], and has been bedbound since return home. Was in several rehab facilities with continued mobility limitations. Admits to taking 2 courses of IV abx while at [facility], last one in January. Patient notes stable edema with worsening erythema to mid medial thigh, with purulence (mostly present on anterior shin). SIRS criteria 0/4 on admission without leukocytosis, tachypnea, tachycardia, or L shift. Elevated ESR, CRP. Wound culture growing Proteus mirabilis, Pseudomonas aeruginosa, Citrobacter koseri, MRSA and staph epidermidis.   - Continue linezolid 600mg oral BID last day 4/24/23  - Continue zosyn 4.5g IV q8 last day 4/26/23  - Tylenol 650mg q6hr PRN for pain.    ·  Problem: Acute deep vein thrombosis (DVT).  ·  Plan: Patient with new proximal DVT on RLE as seen on dopplers. Anti Xa 0.92.   - Started Lovenox 1mg/kg q12h subq  - switched to Eliquis 10mg BID for 7d (4/21-4/27) followed by Eliquis 5mg BID    ·  Problem: Lymphedema.  ·  Plan: Which has limited patient's ability to ambulate, despite multiple [facility] admissions. Patient notes stable edema with overlying skin changes reaching mid medial thigh. Per medical reconciliation patient on ammonium lactate 12%, Clobetasol cream 0.05%, clotrimazole cream 1%, and nystatin powder q8h.   - restarted home ammonium lactate, clotrimazole, and nystatin  - holding clobetasol i/s/o overlying cellulitis     ·  Problem: HTN (hypertension).  ·  Plan: Patient no longer following with PMD (recently retired), on home toprol 50mg PO qd, spironolactone 25mg PO qd, furosemide 20mg PO qd, bumetanide 1mg PO q12h, KCl 20meq ER tab qd. While in ED patient SBP 160s, remained asymptomatic, corrected to 120s s/p pain control with 2mg IV morphine.   - Holding home diuretics for now as unclear why patient on both furosemide and bumetanide  - Obtain collateral from PCP Dr. Whitmore.    ·  Problem: Gout.   ·  Plan: on home allopurinol 100mg PO qd  - c/w home med.         Patient was discharged to: [facility]  New medications: zosyn 4.5g IV q8 until 4/26/23, linezolid 600mg PO BID until 4/24/23  Changes to old medications: stop furosemide and bumetanide  Items to follow up as outpatient: CBC, BMP   #Discharge: do not delete    72F PMH HTN, DVT (many yrs ago after childbirth, not on AC), chronic b/l LE lymphedema, admitted to [facility] for b/l LE cellulitis treated with IV antibiotics.     ·  Problem: Cellulitis of leg.   ·  Plan: Pt reports fall roughly 6 months ago with large wound to RLE, required [facility], and has been bedbound since return home. Was in several rehab facilities with continued mobility limitations. Admits to taking 2 courses of IV abx while at [facility], last one in January. Patient notes stable edema with worsening erythema to mid medial thigh, with purulence (mostly present on anterior shin). SIRS criteria 0/4 on admission without leukocytosis, tachypnea, tachycardia, or L shift. Elevated ESR, CRP. Wound culture growing Proteus mirabilis, Pseudomonas aeruginosa, Citrobacter koseri, MRSA and staph epidermidis.   - Continue linezolid 600mg oral BID last day 4/24/23  - Continue zosyn 4.5g IV q8 last day 4/26/23  - Tylenol 650mg q6hr PRN for pain.    ·  Problem: Acute deep vein thrombosis (DVT).  ·  Plan: Patient with new proximal DVT on RLE as seen on dopplers. Anti Xa 0.92.   - Started Lovenox 1mg/kg q12h subq  - switched to Eliquis 10mg BID for 7d (4/21-4/27) followed by Eliquis 5mg BID    #Problem: nondisplaced sacral fracture  - Plan: CT Pelvis showing L sacral ala fracture. Ortho consulted.  - no ortho surgical intervention  - weight bearing as tolerated    ·  Problem: Lymphedema.  ·  Plan: Which has limited patient's ability to ambulate, despite multiple [facility] admissions. Patient notes stable edema with overlying skin changes reaching mid medial thigh. Per medical reconciliation patient on ammonium lactate 12%, Clobetasol cream 0.05%, clotrimazole cream 1%, and nystatin powder q8h.   - restarted home ammonium lactate, clotrimazole, and nystatin  - holding clobetasol i/s/o overlying cellulitis     ·  Problem: HTN (hypertension).  ·  Plan: Patient no longer following with PMD (recently retired), on home toprol 50mg PO qd, spironolactone 25mg PO qd, furosemide 20mg PO qd, bumetanide 1mg PO q12h, KCl 20meq ER tab qd. While in ED patient SBP 160s, remained asymptomatic, corrected to 120s s/p pain control with 2mg IV morphine.   - Holding home diuretics for now as unclear why patient on both furosemide and bumetanide  - Obtain collateral from PCP Dr. Whitmore.    ·  Problem: Gout.   ·  Plan: on home allopurinol 100mg PO qd  - c/w home med.         Patient was discharged to: [facility]  New medications: zosyn 4.5g IV q8 until 4/26/23, linezolid 600mg PO BID until 4/24/23  Changes to old medications: stop furosemide and bumetanide  Items to follow up as outpatient: CBC, BMP

## 2023-04-21 NOTE — PROGRESS NOTE ADULT - SUBJECTIVE AND OBJECTIVE BOX
SUBJECTIVE: Patient seen and examined bedside; feeling well this am, legs are feeling much better, no complaints.    enoxaparin Injectable 150 milliGRAM(s) SubCutaneous every 12 hours  linezolid    Tablet 600 milliGRAM(s) Oral every 12 hours  metoprolol succinate ER 50 milliGRAM(s) Oral daily  piperacillin/tazobactam IVPB.. 4.5 Gram(s) IV Intermittent every 8 hours      Vital Signs Last 24 Hrs  T(C): 37.4 (21 Apr 2023 08:57), Max: 37.4 (21 Apr 2023 08:57)  T(F): 99.3 (21 Apr 2023 08:57), Max: 99.3 (21 Apr 2023 08:57)  HR: 63 (21 Apr 2023 08:57) (63 - 71)  BP: 123/72 (21 Apr 2023 08:57) (115/72 - 160/78)  BP(mean): --  RR: 18 (21 Apr 2023 08:57) (17 - 18)  SpO2: 92% (21 Apr 2023 08:57) (92% - 94%)    Parameters below as of 21 Apr 2023 08:57  Patient On (Oxygen Delivery Method): room air      I&O's Detail    PE:    General: NAD, resting comfortably in bed  C/V: NSR, s1 s2  Pulm: Nonlabored breathing, no respiratory distress  Abd: Obese, soft, NTND  Extrem: B/l anterior shins erythema extending from ankles to upper leg, improved from yesterday, warm, soft, nontender        LABS:                        11.7   7.49  )-----------( 255      ( 20 Apr 2023 18:36 )             39.1     04-20    138  |  100  |  21  ----------------------------<  99  4.3   |  29  |  1.28    Ca    8.8      20 Apr 2023 18:36    TPro  7.3  /  Alb  3.2<L>  /  TBili  0.4  /  DBili  x   /  AST  11  /  ALT  11  /  AlkPhos  75  04-20          RADIOLOGY & ADDITIONAL STUDIES:

## 2023-04-21 NOTE — DISCHARGE NOTE PROVIDER - NSDCMRMEDTOKEN_GEN_ALL_CORE_FT
allopurinol 100 mg oral tablet: 1 tab(s) orally once a day  ammonium lactate 12% topical lotion: 1 Apply topically to affected area every 12 hours  apixaban 5 mg oral tablet: 2 tab(s) orally every 12 hours  clotrimazole 1% topical cream: 1 Apply topically to affected area every 12 hours  linezolid 600 mg oral tablet: 1 tab(s) orally every 12 hours  metoprolol succinate 50 mg oral tablet, extended release: 1 tab(s) orally once a day  nystatin 100,000 units/g topical powder: 1 Apply topically to affected area every 8 hours  potassium chloride 20 mEq oral tablet, extended release: 1 tab(s) orally once a day   allopurinol 100 mg oral tablet: 1 tab(s) orally once a day  ammonium lactate 12% topical lotion: 1 Apply topically to affected area every 12 hours  apixaban 5 mg oral tablet: 2 tab(s) orally every 12 hours  clotrimazole 1% topical cream: 1 Apply topically to affected area every 12 hours  linezolid 600 mg oral tablet: 1 tab(s) orally every 12 hours  metoprolol succinate 50 mg oral tablet, extended release: 1 tab(s) orally once a day  nystatin 100,000 units/g topical powder: 1 Apply topically to affected area every 8 hours  piperacillin-tazobactam: 4.5 gram(s) intravenous every 8 hours

## 2023-04-22 LAB
% ALBUMIN: 46.9 % — SIGNIFICANT CHANGE UP
% ALPHA 1: 4.9 % — SIGNIFICANT CHANGE UP
% ALPHA 2: 11.7 % — SIGNIFICANT CHANGE UP
% BETA: 13.2 % — SIGNIFICANT CHANGE UP
% GAMMA: 23.3 % — SIGNIFICANT CHANGE UP
ALBUMIN SERPL ELPH-MCNC: 3.2 G/DL — LOW (ref 3.6–5.5)
ALBUMIN/GLOB SERPL ELPH: 0.9 RATIO — SIGNIFICANT CHANGE UP
ALPHA1 GLOB SERPL ELPH-MCNC: 0.3 G/DL — SIGNIFICANT CHANGE UP (ref 0.1–0.4)
ALPHA2 GLOB SERPL ELPH-MCNC: 0.8 G/DL — SIGNIFICANT CHANGE UP (ref 0.5–1)
B-GLOBULIN SERPL ELPH-MCNC: 0.9 G/DL — SIGNIFICANT CHANGE UP (ref 0.5–1)
GAMMA GLOBULIN: 1.6 G/DL — SIGNIFICANT CHANGE UP (ref 0.6–1.6)
INTERPRETATION SERPL IFE-IMP: SIGNIFICANT CHANGE UP
PROT ?TM UR-MCNC: 16 MG/DL — HIGH (ref 0–12)
PROT ?TM UR-MCNC: 16 MG/DL — HIGH (ref 0–12)
PROT PATTERN SERPL ELPH-IMP: SIGNIFICANT CHANGE UP

## 2023-04-23 LAB
-  AMPICILLIN/SULBACTAM: SIGNIFICANT CHANGE UP
-  AMPICILLIN: SIGNIFICANT CHANGE UP
-  CEFAZOLIN: SIGNIFICANT CHANGE UP
-  CEFEPIME: SIGNIFICANT CHANGE UP
-  CEFTRIAXONE: SIGNIFICANT CHANGE UP
-  CIPROFLOXACIN: SIGNIFICANT CHANGE UP
-  ERTAPENEM: SIGNIFICANT CHANGE UP
-  GENTAMICIN: SIGNIFICANT CHANGE UP
-  PIPERACILLIN/TAZOBACTAM: SIGNIFICANT CHANGE UP
-  TOBRAMYCIN: SIGNIFICANT CHANGE UP
-  TRIMETHOPRIM/SULFAMETHOXAZOLE: SIGNIFICANT CHANGE UP
CULTURE RESULTS: SIGNIFICANT CHANGE UP
METHOD TYPE: SIGNIFICANT CHANGE UP
ORGANISM # SPEC MICROSCOPIC CNT: SIGNIFICANT CHANGE UP
SPECIMEN SOURCE: SIGNIFICANT CHANGE UP

## 2023-04-26 LAB
% GAMMA, URINE: 7.5 % — SIGNIFICANT CHANGE UP
ALBUMIN 24H MFR UR ELPH: 30.7 % — SIGNIFICANT CHANGE UP
ALPHA1 GLOB 24H MFR UR ELPH: 34.5 % — SIGNIFICANT CHANGE UP
ALPHA2 GLOB 24H MFR UR ELPH: 15.2 % — SIGNIFICANT CHANGE UP
B-GLOBULIN 24H MFR UR ELPH: 12.1 % — SIGNIFICANT CHANGE UP
INTERPRETATION 24H UR IFE-IMP: SIGNIFICANT CHANGE UP
INTERPRETATION 24H UR IFE-IMP: SIGNIFICANT CHANGE UP
M PROTEIN 24H UR ELPH-MRATE: SIGNIFICANT CHANGE UP
PROT PATTERN 24H UR ELPH-IMP: SIGNIFICANT CHANGE UP

## 2023-04-27 DIAGNOSIS — Z20.822 CONTACT WITH AND (SUSPECTED) EXPOSURE TO COVID-19: ICD-10-CM

## 2023-04-27 DIAGNOSIS — L97.929 NON-PRESSURE CHRONIC ULCER OF UNSPECIFIED PART OF LEFT LOWER LEG WITH UNSPECIFIED SEVERITY: ICD-10-CM

## 2023-04-27 DIAGNOSIS — M54.50 LOW BACK PAIN, UNSPECIFIED: ICD-10-CM
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
## 2023-04-27 DIAGNOSIS — L03.116 CELLULITIS OF LEFT LOWER LIMB: ICD-10-CM

## 2023-04-27 DIAGNOSIS — L03.115 CELLULITIS OF RIGHT LOWER LIMB: ICD-10-CM

## 2023-04-27 DIAGNOSIS — N17.9 ACUTE KIDNEY FAILURE, UNSPECIFIED: ICD-10-CM
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
## 2023-04-27 DIAGNOSIS — B95.7 OTHER STAPHYLOCOCCUS AS THE CAUSE OF DISEASES CLASSIFIED ELSEWHERE: ICD-10-CM

## 2023-04-27 DIAGNOSIS — M10.9 GOUT, UNSPECIFIED: ICD-10-CM

## 2023-04-27 DIAGNOSIS — E66.01 MORBID (SEVERE) OBESITY DUE TO EXCESS CALORIES: ICD-10-CM

## 2023-04-27 DIAGNOSIS — L97.919 NON-PRESSURE CHRONIC ULCER OF UNSPECIFIED PART OF RIGHT LOWER LEG WITH UNSPECIFIED SEVERITY: ICD-10-CM

## 2023-04-27 DIAGNOSIS — B96.5 PSEUDOMONAS (AERUGINOSA) (MALLEI) (PSEUDOMALLEI) AS THE CAUSE OF DISEASES CLASSIFIED ELSEWHERE: ICD-10-CM

## 2023-04-27 DIAGNOSIS — B96.4 PROTEUS (MIRABILIS) (MORGANII) AS THE CAUSE OF DISEASES CLASSIFIED ELSEWHERE: ICD-10-CM

## 2023-04-27 DIAGNOSIS — S32.19XA OTHER FRACTURE OF SACRUM, INITIAL ENCOUNTER FOR CLOSED FRACTURE: ICD-10-CM

## 2023-04-27 DIAGNOSIS — I82.431 ACUTE EMBOLISM AND THROMBOSIS OF RIGHT POPLITEAL VEIN: ICD-10-CM

## 2023-04-27 DIAGNOSIS — Z74.01 BED CONFINEMENT STATUS: ICD-10-CM

## 2023-04-27 DIAGNOSIS — I10 ESSENTIAL (PRIMARY) HYPERTENSION: ICD-10-CM

## 2023-04-27 DIAGNOSIS — B95.62 METHICILLIN RESISTANT STAPHYLOCOCCUS AUREUS INFECTION AS THE CAUSE OF DISEASES CLASSIFIED ELSEWHERE: ICD-10-CM

## 2023-04-27 DIAGNOSIS — I87.8 OTHER SPECIFIED DISORDERS OF VEINS: ICD-10-CM

## 2023-04-27 DIAGNOSIS — G89.29 OTHER CHRONIC PAIN: ICD-10-CM

## 2023-04-27 DIAGNOSIS — I82.411 ACUTE EMBOLISM AND THROMBOSIS OF RIGHT FEMORAL VEIN: ICD-10-CM

## 2025-05-01 NOTE — DISCHARGE NOTE PROVIDER - NSDCCAREPROVSEEN_GEN_ALL_CORE_FT
[facility]   [address] [place] [address]  [phone]  05/01/25    Mayte Mcgowan  [address]  [facility] [address]       Dear Mayte Mcgowan,    Numerous attempts have been made to contact you via the telephone regarding your recent test(s) performed at the clinic and need for treatment.    At your earliest convenience, please contact the clinic with your new phone number and so that this information may be communicated to you.    Sincerely,        Guy Harris MD    
Ross Miller